# Patient Record
Sex: FEMALE | Race: BLACK OR AFRICAN AMERICAN | NOT HISPANIC OR LATINO | Employment: OTHER | URBAN - METROPOLITAN AREA
[De-identification: names, ages, dates, MRNs, and addresses within clinical notes are randomized per-mention and may not be internally consistent; named-entity substitution may affect disease eponyms.]

---

## 2023-03-04 PROBLEM — E11.9 DIABETES (MULTI): Status: ACTIVE | Noted: 2023-03-04

## 2023-03-04 PROBLEM — R51.9 HEADACHE: Status: ACTIVE | Noted: 2023-03-04

## 2023-03-04 PROBLEM — M85.80 OSTEOPENIA: Status: ACTIVE | Noted: 2023-03-04

## 2023-03-04 PROBLEM — N39.0 RECURRENT UTI: Status: ACTIVE | Noted: 2023-03-04

## 2023-03-04 PROBLEM — H25.13 NUCLEAR SCLEROSIS OF BOTH EYES: Status: ACTIVE | Noted: 2023-03-04

## 2023-03-04 PROBLEM — J44.9 CHRONIC OBSTRUCTIVE PULMONARY DISEASE (MULTI): Status: ACTIVE | Noted: 2023-03-04

## 2023-03-04 PROBLEM — H52.4 BILATERAL PRESBYOPIA: Status: ACTIVE | Noted: 2023-03-04

## 2023-03-04 PROBLEM — K92.2 GI BLEED: Status: ACTIVE | Noted: 2023-03-04

## 2023-03-04 PROBLEM — R25.2 LEG CRAMPS: Status: ACTIVE | Noted: 2023-03-04

## 2023-03-04 PROBLEM — E27.8 ADRENAL MASS (MULTI): Status: ACTIVE | Noted: 2023-03-04

## 2023-03-04 PROBLEM — I73.9 PERIPHERAL VASCULAR DISEASE (CMS-HCC): Status: ACTIVE | Noted: 2023-03-04

## 2023-03-04 PROBLEM — D64.9 ANEMIA: Status: ACTIVE | Noted: 2023-03-04

## 2023-03-04 PROBLEM — E87.6 HYPOKALEMIA: Status: ACTIVE | Noted: 2023-03-04

## 2023-03-04 PROBLEM — E24.9 CUSHING'S SYNDROME (MULTI): Status: ACTIVE | Noted: 2023-03-04

## 2023-03-04 PROBLEM — L67.8 ABNORMAL FACIAL HAIR: Status: ACTIVE | Noted: 2023-03-04

## 2023-03-04 PROBLEM — R65.21 SEPTIC SHOCK (MULTI): Status: ACTIVE | Noted: 2023-03-04

## 2023-03-04 PROBLEM — M25.561 RIGHT KNEE PAIN: Status: ACTIVE | Noted: 2023-03-04

## 2023-03-04 PROBLEM — I48.91 ATRIAL FIBRILLATION (MULTI): Status: ACTIVE | Noted: 2023-03-04

## 2023-03-04 PROBLEM — H52.203 ASTIGMATISM, BILATERAL: Status: ACTIVE | Noted: 2023-03-04

## 2023-03-04 PROBLEM — M17.11 OSTEOARTHRITIS OF RIGHT KNEE: Status: ACTIVE | Noted: 2023-03-04

## 2023-03-04 PROBLEM — Q07.8 ANOMALOUS OPTIC NERVE (MULTI): Status: ACTIVE | Noted: 2023-03-04

## 2023-03-04 PROBLEM — N39.46 URGE AND STRESS INCONTINENCE: Status: ACTIVE | Noted: 2023-03-04

## 2023-03-04 PROBLEM — R00.2 PALPITATION: Status: ACTIVE | Noted: 2023-03-04

## 2023-03-04 PROBLEM — H90.3 SENSORINEURAL HEARING LOSS, BILATERAL: Status: ACTIVE | Noted: 2023-03-04

## 2023-03-04 PROBLEM — H52.13 BILATERAL MYOPIA: Status: ACTIVE | Noted: 2023-03-04

## 2023-03-04 PROBLEM — R09.89 BRUIT OF LEFT CAROTID ARTERY: Status: ACTIVE | Noted: 2023-03-04

## 2023-03-04 PROBLEM — M16.9 OSTEOARTHRITIS OF HIP: Status: ACTIVE | Noted: 2023-03-04

## 2023-03-04 PROBLEM — R73.09 ABNORMAL BLOOD SUGAR: Status: ACTIVE | Noted: 2023-03-04

## 2023-03-04 PROBLEM — N39.0 UTI (URINARY TRACT INFECTION): Status: ACTIVE | Noted: 2023-03-04

## 2023-03-04 PROBLEM — E03.9 HYPOTHYROIDISM: Status: ACTIVE | Noted: 2023-03-04

## 2023-03-04 PROBLEM — R07.89 CHEST PAIN, ATYPICAL: Status: ACTIVE | Noted: 2023-03-04

## 2023-03-04 PROBLEM — N95.2 VAGINAL ATROPHY: Status: ACTIVE | Noted: 2023-03-04

## 2023-03-04 PROBLEM — I10 HYPERTENSION: Status: ACTIVE | Noted: 2023-03-04

## 2023-03-04 PROBLEM — M54.50 LOW BACK PAIN: Status: ACTIVE | Noted: 2023-03-04

## 2023-03-04 PROBLEM — R21 SKIN RASH: Status: ACTIVE | Noted: 2023-03-04

## 2023-03-04 PROBLEM — K63.5 COLON POLYPS: Status: ACTIVE | Noted: 2023-03-04

## 2023-03-04 PROBLEM — H25.013 CORTICAL AGE-RELATED CATARACT OF BOTH EYES: Status: ACTIVE | Noted: 2023-03-04

## 2023-03-04 PROBLEM — K22.89: Status: ACTIVE | Noted: 2023-03-04

## 2023-03-04 PROBLEM — H25.10 AGE-RELATED NUCLEAR CATARACT: Status: ACTIVE | Noted: 2023-03-04

## 2023-03-04 PROBLEM — H40.009 GLAUCOMA SUSPECT: Status: ACTIVE | Noted: 2023-03-04

## 2023-03-04 PROBLEM — N81.4 UTERUS PROLAPSE: Status: ACTIVE | Noted: 2023-03-04

## 2023-03-04 PROBLEM — I50.20 HFREF (HEART FAILURE WITH REDUCED EJECTION FRACTION) (MULTI): Status: ACTIVE | Noted: 2023-03-04

## 2023-03-04 PROBLEM — A41.9 SEPTIC SHOCK (MULTI): Status: ACTIVE | Noted: 2023-03-04

## 2023-03-04 PROBLEM — N17.9 ACUTE RENAL FAILURE (CMS-HCC): Status: ACTIVE | Noted: 2023-03-04

## 2023-03-04 PROBLEM — M17.12 OSTEOARTHRITIS OF LEFT KNEE: Status: ACTIVE | Noted: 2023-03-04

## 2023-03-04 RX ORDER — LOSARTAN POTASSIUM 100 MG/1
1 TABLET ORAL DAILY
COMMUNITY
Start: 2021-10-13 | End: 2023-09-10 | Stop reason: SDUPTHER

## 2023-03-04 RX ORDER — NAPROXEN SODIUM 220 MG/1
1 TABLET, FILM COATED ORAL DAILY
COMMUNITY
Start: 2022-06-16

## 2023-03-04 RX ORDER — FUROSEMIDE 40 MG/1
1 TABLET ORAL DAILY
COMMUNITY
Start: 2021-09-28 | End: 2023-09-10 | Stop reason: SDUPTHER

## 2023-03-04 RX ORDER — ATORVASTATIN CALCIUM 20 MG/1
40 TABLET, FILM COATED ORAL DAILY
COMMUNITY
Start: 2013-05-29 | End: 2023-03-31 | Stop reason: SDUPTHER

## 2023-03-04 RX ORDER — DEXAMETHASONE 1 MG/1
1 TABLET ORAL
COMMUNITY
Start: 2022-06-21 | End: 2024-03-27 | Stop reason: ALTCHOICE

## 2023-03-04 RX ORDER — TIOTROPIUM BROMIDE 18 UG/1
1 CAPSULE ORAL; RESPIRATORY (INHALATION)
COMMUNITY
Start: 2013-05-29 | End: 2023-09-10 | Stop reason: SDUPTHER

## 2023-03-04 RX ORDER — LATANOPROST 50 UG/ML
1 SOLUTION/ DROPS OPHTHALMIC NIGHTLY
COMMUNITY
Start: 2021-07-26 | End: 2024-06-03

## 2023-03-04 RX ORDER — ESOMEPRAZOLE MAGNESIUM 40 MG/1
1 CAPSULE, DELAYED RELEASE ORAL DAILY
COMMUNITY
Start: 2020-08-18

## 2023-03-04 RX ORDER — CHOLECALCIFEROL (VITAMIN D3) 50 MCG
1 TABLET ORAL DAILY
COMMUNITY
Start: 2014-06-03

## 2023-03-04 RX ORDER — PREDNISOLONE ACETATE 10 MG/ML
1 SUSPENSION/ DROPS OPHTHALMIC 4 TIMES DAILY
COMMUNITY
End: 2023-11-01 | Stop reason: WASHOUT

## 2023-03-04 RX ORDER — AMLODIPINE BESYLATE 10 MG/1
1 TABLET ORAL DAILY
COMMUNITY
Start: 2021-10-20 | End: 2023-07-11 | Stop reason: SDUPTHER

## 2023-03-04 RX ORDER — METOPROLOL TARTRATE 100 MG/1
100 TABLET ORAL DAILY
COMMUNITY
Start: 2022-11-12 | End: 2023-09-10 | Stop reason: SDUPTHER

## 2023-03-16 LAB
ANION GAP IN SER/PLAS: 16 MMOL/L (ref 10–20)
CALCIUM (MG/DL) IN SER/PLAS: 9.4 MG/DL (ref 8.6–10.6)
CARBON DIOXIDE, TOTAL (MMOL/L) IN SER/PLAS: 21 MMOL/L (ref 21–32)
CHLORIDE (MMOL/L) IN SER/PLAS: 106 MMOL/L (ref 98–107)
CHOLESTEROL (MG/DL) IN SER/PLAS: 177 MG/DL (ref 0–199)
CHOLESTEROL IN HDL (MG/DL) IN SER/PLAS: 54.3 MG/DL
CHOLESTEROL/HDL RATIO: 3.3
CREATININE (MG/DL) IN SER/PLAS: 0.97 MG/DL (ref 0.5–1.05)
GFR FEMALE: 59 ML/MIN/1.73M2
GLUCOSE (MG/DL) IN SER/PLAS: 90 MG/DL (ref 74–99)
NON-HDL CHOLESTEROL: 123 MG/DL
POTASSIUM (MMOL/L) IN SER/PLAS: 4.2 MMOL/L (ref 3.5–5.3)
SODIUM (MMOL/L) IN SER/PLAS: 139 MMOL/L (ref 136–145)
UREA NITROGEN (MG/DL) IN SER/PLAS: 22 MG/DL (ref 6–23)

## 2023-03-17 ENCOUNTER — TELEPHONE (OUTPATIENT)
Dept: PRIMARY CARE | Facility: CLINIC | Age: 83
End: 2023-03-17

## 2023-03-21 ENCOUNTER — APPOINTMENT (OUTPATIENT)
Dept: PRIMARY CARE | Facility: CLINIC | Age: 83
End: 2023-03-21

## 2023-03-30 ENCOUNTER — TELEPHONE (OUTPATIENT)
Dept: PRIMARY CARE | Facility: CLINIC | Age: 83
End: 2023-03-30

## 2023-03-30 NOTE — TELEPHONE ENCOUNTER
----- Message from Jeremy Huynh MD sent at 3/30/2023  8:24 AM EDT -----  Please schedule appt with me to discuss pvr test results

## 2023-03-31 ENCOUNTER — OFFICE VISIT (OUTPATIENT)
Dept: PRIMARY CARE | Facility: CLINIC | Age: 83
End: 2023-03-31
Payer: COMMERCIAL

## 2023-03-31 VITALS
SYSTOLIC BLOOD PRESSURE: 126 MMHG | RESPIRATION RATE: 16 BRPM | BODY MASS INDEX: 25.16 KG/M2 | DIASTOLIC BLOOD PRESSURE: 82 MMHG | HEART RATE: 72 BPM | WEIGHT: 151.2 LBS

## 2023-03-31 DIAGNOSIS — I73.9 PVD (PERIPHERAL VASCULAR DISEASE) (CMS-HCC): ICD-10-CM

## 2023-03-31 DIAGNOSIS — E78.5 HYPERLIPIDEMIA, UNSPECIFIED HYPERLIPIDEMIA TYPE: ICD-10-CM

## 2023-03-31 DIAGNOSIS — J45.20 MILD INTERMITTENT ASTHMATIC BRONCHITIS WITHOUT COMPLICATION (HHS-HCC): ICD-10-CM

## 2023-03-31 DIAGNOSIS — R05.9 COUGH, UNSPECIFIED TYPE: Primary | ICD-10-CM

## 2023-03-31 PROCEDURE — 99215 OFFICE O/P EST HI 40 MIN: CPT | Performed by: INTERNAL MEDICINE

## 2023-03-31 PROCEDURE — 1159F MED LIST DOCD IN RCRD: CPT | Performed by: INTERNAL MEDICINE

## 2023-03-31 PROCEDURE — 3079F DIAST BP 80-89 MM HG: CPT | Performed by: INTERNAL MEDICINE

## 2023-03-31 PROCEDURE — 1160F RVW MEDS BY RX/DR IN RCRD: CPT | Performed by: INTERNAL MEDICINE

## 2023-03-31 PROCEDURE — 3074F SYST BP LT 130 MM HG: CPT | Performed by: INTERNAL MEDICINE

## 2023-03-31 RX ORDER — PREDNISONE 20 MG/1
TABLET ORAL
Qty: 15 TABLET | Refills: 0 | Status: SHIPPED | OUTPATIENT
Start: 2023-03-31 | End: 2024-03-27 | Stop reason: ALTCHOICE

## 2023-03-31 RX ORDER — ATORVASTATIN CALCIUM 20 MG/1
40 TABLET, FILM COATED ORAL DAILY
Qty: 90 TABLET | Refills: 2 | Status: SHIPPED | OUTPATIENT
Start: 2023-03-31 | End: 2023-03-31 | Stop reason: ENTERED-IN-ERROR

## 2023-03-31 RX ORDER — ALBUTEROL SULFATE 90 UG/1
2 AEROSOL, METERED RESPIRATORY (INHALATION) EVERY 4 HOURS PRN
Qty: 8.5 G | Refills: 0 | Status: SHIPPED | OUTPATIENT
Start: 2023-03-31 | End: 2024-04-16

## 2023-03-31 RX ORDER — ATORVASTATIN CALCIUM 40 MG/1
40 TABLET, FILM COATED ORAL DAILY
Qty: 90 TABLET | Refills: 2 | Status: SHIPPED | OUTPATIENT
Start: 2023-03-31 | End: 2023-09-10 | Stop reason: SDUPTHER

## 2023-03-31 RX ORDER — AMOXICILLIN 500 MG/1
500 CAPSULE ORAL EVERY 8 HOURS SCHEDULED
Qty: 30 CAPSULE | Refills: 0 | Status: SHIPPED | OUTPATIENT
Start: 2023-03-31 | End: 2023-04-10

## 2023-03-31 ASSESSMENT — ENCOUNTER SYMPTOMS
RHINORRHEA: 0
HEMOPTYSIS: 0
DEPRESSION: 0
COUGH: 1
HEARTBURN: 0
SHORTNESS OF BREATH: 1
OCCASIONAL FEELINGS OF UNSTEADINESS: 0
FEVER: 0
LOSS OF SENSATION IN FEET: 0
CHILLS: 0
SORE THROAT: 0
HEADACHES: 0
WHEEZING: 1

## 2023-03-31 ASSESSMENT — PATIENT HEALTH QUESTIONNAIRE - PHQ9
SUM OF ALL RESPONSES TO PHQ9 QUESTIONS 1 AND 2: 0
1. LITTLE INTEREST OR PLEASURE IN DOING THINGS: NOT AT ALL
1. LITTLE INTEREST OR PLEASURE IN DOING THINGS: NOT AT ALL
2. FEELING DOWN, DEPRESSED OR HOPELESS: NOT AT ALL
2. FEELING DOWN, DEPRESSED OR HOPELESS: NOT AT ALL
SUM OF ALL RESPONSES TO PHQ9 QUESTIONS 1 AND 2: 0

## 2023-03-31 NOTE — PROGRESS NOTES
Patient ID: Dora Palmer is a 82 y.o. female who presents for Follow-up (Follow up test results, also still has cough).    /82   Pulse 72   Resp 16   Wt 68.6 kg (151 lb 3.2 oz)   BMI 25.16 kg/m²     Cough  This is a chronic problem. Episode onset: FEW MONTHS. The problem has been unchanged. The problem occurs constantly. The cough is Non-productive. Associated symptoms include nasal congestion, postnasal drip, shortness of breath and wheezing. Pertinent negatives include no chest pain, chills, ear congestion, ear pain, fever, headaches, heartburn, hemoptysis, rhinorrhea or sore throat. Nothing aggravates the symptoms. Risk factors for lung disease include smoking/tobacco exposure. She has tried OTC cough suppressant for the symptoms. The treatment provided no relief. Her past medical history is significant for emphysema.     Also here to discuss test result  He has bilateral carotid Doppler studies  And she has PVR studies too    Patient has COPD  Still smoking  She is often having wheezing  She also feels sometimes short of breath            Subjective     Review of Systems   Constitutional:  Negative for chills and fever.   HENT:  Positive for postnasal drip. Negative for ear pain, rhinorrhea and sore throat.    Respiratory:  Positive for cough, shortness of breath and wheezing. Negative for hemoptysis.    Cardiovascular:  Negative for chest pain.   Gastrointestinal:  Negative for heartburn.   Musculoskeletal:         I get tightness in my calf muscle in both legs when I walk for some time   Neurological:  Negative for headaches.       Objective     Physical Exam  Vitals and nursing note reviewed.   Constitutional:       Appearance: Normal appearance.   Neck:      Vascular: No carotid bruit.   Cardiovascular:      Rate and Rhythm: Normal rate and regular rhythm.      Pulses:           Dorsalis pedis pulses are 1+ on the right side and 1+ on the left side.        Posterior tibial pulses are 1+ on the  right side and 1+ on the left side.      Heart sounds: Normal heart sounds.   Pulmonary:      Effort: Prolonged expiration present.      Breath sounds: Decreased air movement present. Examination of the right-upper field reveals decreased breath sounds and wheezing. Examination of the left-upper field reveals decreased breath sounds and wheezing. Examination of the right-middle field reveals decreased breath sounds and wheezing. Examination of the left-middle field reveals decreased breath sounds and wheezing. Examination of the right-lower field reveals decreased breath sounds and wheezing. Examination of the left-lower field reveals decreased breath sounds and wheezing. Decreased breath sounds and wheezing present.   Musculoskeletal:      Right lower leg: No edema.      Left lower leg: No edema.   Feet:      Right foot:      Skin integrity: Skin integrity normal.      Left foot:      Skin integrity: Skin integrity normal.   Neurological:      Mental Status: She is alert.   Psychiatric:         Mood and Affect: Mood normal.         Behavior: Behavior normal.         Thought Content: Thought content normal.         Judgment: Judgment normal.         Lab Results   Component Value Date    WBC 3.4 (L) 02/28/2023    HGB 14.4 02/28/2023    HCT 43.8 02/28/2023    MCV 96 02/28/2023     02/28/2023           Problem List Items Addressed This Visit    None  Visit Diagnoses       Cough, unspecified type    -  Primary    Relevant Orders    XR chest 2 views    PVD (peripheral vascular disease) (CMS/[facility])        Relevant Medications    atorvastatin (Lipitor) 40 mg tablet    Other Relevant Orders    Referral to Vascular Surgery    Hyperlipidemia, unspecified hyperlipidemia type        Relevant Medications    atorvastatin (Lipitor) 40 mg tablet             A/P        Patient is severe peripheral vascular disease  She is having significant claudication in both legs  I told her she needs to stop smoking  And needs to do some  graded exercise involving both legs  Referral placed for vascular  I have optimized atorvastatin from 20 mg to 40 mg  Discussed the effect of smoking on overall physical and mental wellbeing  That smoking can cause peripheral vascular disease it can be a risk factor for stroke  Heart attack cancer osteoporosis  Since she has chronic cough for more than a month  I suspect this is mostly related to her COPD  I will get a chest x-ray right now

## 2023-05-09 ENCOUNTER — HOSPITAL ENCOUNTER (OUTPATIENT)
Dept: DATA CONVERSION | Facility: HOSPITAL | Age: 83
End: 2023-05-09
Attending: OPHTHALMOLOGY | Admitting: OPHTHALMOLOGY
Payer: MEDICARE

## 2023-05-09 DIAGNOSIS — D64.9 ANEMIA, UNSPECIFIED: ICD-10-CM

## 2023-05-09 DIAGNOSIS — E11.36 TYPE 2 DIABETES MELLITUS WITH DIABETIC CATARACT (MULTI): ICD-10-CM

## 2023-05-09 DIAGNOSIS — F17.200 NICOTINE DEPENDENCE, UNSPECIFIED, UNCOMPLICATED: ICD-10-CM

## 2023-05-09 DIAGNOSIS — I50.33 ACUTE ON CHRONIC DIASTOLIC (CONGESTIVE) HEART FAILURE (MULTI): ICD-10-CM

## 2023-05-09 DIAGNOSIS — I48.0 PAROXYSMAL ATRIAL FIBRILLATION (MULTI): ICD-10-CM

## 2023-05-09 DIAGNOSIS — Z79.01 LONG TERM (CURRENT) USE OF ANTICOAGULANTS: ICD-10-CM

## 2023-05-09 DIAGNOSIS — H40.1121 PRIMARY OPEN-ANGLE GLAUCOMA, LEFT EYE, MILD STAGE: ICD-10-CM

## 2023-05-09 DIAGNOSIS — K21.9 GASTRO-ESOPHAGEAL REFLUX DISEASE WITHOUT ESOPHAGITIS: ICD-10-CM

## 2023-05-09 DIAGNOSIS — E03.9 HYPOTHYROIDISM, UNSPECIFIED: ICD-10-CM

## 2023-05-09 DIAGNOSIS — J44.9 CHRONIC OBSTRUCTIVE PULMONARY DISEASE, UNSPECIFIED (MULTI): ICD-10-CM

## 2023-05-09 DIAGNOSIS — H25.812 COMBINED FORMS OF AGE-RELATED CATARACT, LEFT EYE: ICD-10-CM

## 2023-05-09 DIAGNOSIS — I11.0 HYPERTENSIVE HEART DISEASE WITH HEART FAILURE (MULTI): ICD-10-CM

## 2023-05-09 DIAGNOSIS — I73.9 PERIPHERAL VASCULAR DISEASE, UNSPECIFIED (CMS-HCC): ICD-10-CM

## 2023-05-09 DIAGNOSIS — E11.51 TYPE 2 DIABETES MELLITUS WITH DIABETIC PERIPHERAL ANGIOPATHY WITHOUT GANGRENE (MULTI): ICD-10-CM

## 2023-05-09 DIAGNOSIS — Z79.82 LONG TERM (CURRENT) USE OF ASPIRIN: ICD-10-CM

## 2023-05-09 DIAGNOSIS — E86.0 DEHYDRATION: ICD-10-CM

## 2023-05-09 DIAGNOSIS — Z86.19 PERSONAL HISTORY OF OTHER INFECTIOUS AND PARASITIC DISEASES: ICD-10-CM

## 2023-05-09 DIAGNOSIS — Z87.440 PERSONAL HISTORY OF URINARY (TRACT) INFECTIONS: ICD-10-CM

## 2023-05-26 ENCOUNTER — HOSPITAL ENCOUNTER (OUTPATIENT)
Dept: DATA CONVERSION | Facility: HOSPITAL | Age: 83
End: 2023-05-26
Attending: OPHTHALMOLOGY | Admitting: OPHTHALMOLOGY
Payer: MEDICARE

## 2023-05-26 DIAGNOSIS — I48.20 CHRONIC ATRIAL FIBRILLATION, UNSPECIFIED (MULTI): ICD-10-CM

## 2023-05-26 DIAGNOSIS — J44.9 CHRONIC OBSTRUCTIVE PULMONARY DISEASE, UNSPECIFIED (MULTI): ICD-10-CM

## 2023-05-26 DIAGNOSIS — D64.9 ANEMIA, UNSPECIFIED: ICD-10-CM

## 2023-05-26 DIAGNOSIS — I11.0 HYPERTENSIVE HEART DISEASE WITH HEART FAILURE (MULTI): ICD-10-CM

## 2023-05-26 DIAGNOSIS — E03.9 HYPOTHYROIDISM, UNSPECIFIED: ICD-10-CM

## 2023-05-26 DIAGNOSIS — E11.9 TYPE 2 DIABETES MELLITUS WITHOUT COMPLICATIONS (MULTI): ICD-10-CM

## 2023-05-26 DIAGNOSIS — H10.401: ICD-10-CM

## 2023-05-26 DIAGNOSIS — I50.9 HEART FAILURE, UNSPECIFIED (MULTI): ICD-10-CM

## 2023-05-26 DIAGNOSIS — F17.200 NICOTINE DEPENDENCE, UNSPECIFIED, UNCOMPLICATED: ICD-10-CM

## 2023-05-26 DIAGNOSIS — K21.9 GASTRO-ESOPHAGEAL REFLUX DISEASE WITHOUT ESOPHAGITIS: ICD-10-CM

## 2023-05-26 DIAGNOSIS — M19.90 UNSPECIFIED OSTEOARTHRITIS, UNSPECIFIED SITE: ICD-10-CM

## 2023-05-26 DIAGNOSIS — E78.5 HYPERLIPIDEMIA, UNSPECIFIED: ICD-10-CM

## 2023-05-26 DIAGNOSIS — H02.102 UNSPECIFIED ECTROPION OF RIGHT LOWER EYELID: ICD-10-CM

## 2023-05-26 DIAGNOSIS — I73.9 PERIPHERAL VASCULAR DISEASE, UNSPECIFIED (CMS-HCC): ICD-10-CM

## 2023-05-26 DIAGNOSIS — D22.112 MELANOCYTIC NEVI OF RIGHT LOWER EYELID, INCLUDING CANTHUS: ICD-10-CM

## 2023-06-05 ENCOUNTER — APPOINTMENT (OUTPATIENT)
Dept: PRIMARY CARE | Facility: CLINIC | Age: 83
End: 2023-06-05
Payer: MEDICARE

## 2023-06-13 LAB
COMPLETE PATHOLOGY REPORT: NORMAL
CONVERTED CLINICAL DIAGNOSIS-HISTORY: NORMAL
CONVERTED FINAL DIAGNOSIS: NORMAL
CONVERTED FINAL REPORT PDF LINK TO COPY AND PASTE: NORMAL
CONVERTED GROSS DESCRIPTION: NORMAL

## 2023-07-06 ENCOUNTER — TELEPHONE (OUTPATIENT)
Dept: PRIMARY CARE | Facility: CLINIC | Age: 83
End: 2023-07-06
Payer: MEDICARE

## 2023-07-06 NOTE — TELEPHONE ENCOUNTER
Pt. States that her blood pressure has been running between 201/108 and 160/85 for about a week now. States did see cardiology a couple of weeks ago and he added Prazosin at the visit due to high bp. Asking what she should do. She has been transferred to  to get first available appt. Please advise.

## 2023-07-11 ENCOUNTER — OFFICE VISIT (OUTPATIENT)
Dept: PRIMARY CARE | Facility: CLINIC | Age: 83
End: 2023-07-11
Payer: MEDICARE

## 2023-07-11 VITALS
DIASTOLIC BLOOD PRESSURE: 84 MMHG | SYSTOLIC BLOOD PRESSURE: 156 MMHG | WEIGHT: 154.4 LBS | OXYGEN SATURATION: 97 % | HEART RATE: 63 BPM | BODY MASS INDEX: 26.5 KG/M2

## 2023-07-11 DIAGNOSIS — N39.46 URGE AND STRESS INCONTINENCE: ICD-10-CM

## 2023-07-11 DIAGNOSIS — N18.31 STAGE 3A CHRONIC KIDNEY DISEASE (MULTI): ICD-10-CM

## 2023-07-11 DIAGNOSIS — I50.20 HFREF (HEART FAILURE WITH REDUCED EJECTION FRACTION) (MULTI): ICD-10-CM

## 2023-07-11 DIAGNOSIS — E11.51 TYPE 2 DIABETES MELLITUS WITH DIABETIC PERIPHERAL ANGIOPATHY WITHOUT GANGRENE, WITHOUT LONG-TERM CURRENT USE OF INSULIN (MULTI): ICD-10-CM

## 2023-07-11 DIAGNOSIS — I10 HYPERTENSION, UNSPECIFIED TYPE: Primary | ICD-10-CM

## 2023-07-11 DIAGNOSIS — J44.9 CHRONIC OBSTRUCTIVE PULMONARY DISEASE, UNSPECIFIED COPD TYPE (MULTI): ICD-10-CM

## 2023-07-11 DIAGNOSIS — I48.0 PAROXYSMAL ATRIAL FIBRILLATION (MULTI): ICD-10-CM

## 2023-07-11 DIAGNOSIS — I73.9 PERIPHERAL VASCULAR DISEASE (CMS-HCC): ICD-10-CM

## 2023-07-11 DIAGNOSIS — E27.8 ADRENAL MASS (MULTI): ICD-10-CM

## 2023-07-11 DIAGNOSIS — R09.89 BRUIT OF LEFT CAROTID ARTERY: ICD-10-CM

## 2023-07-11 PROCEDURE — 3077F SYST BP >= 140 MM HG: CPT | Performed by: INTERNAL MEDICINE

## 2023-07-11 PROCEDURE — 99213 OFFICE O/P EST LOW 20 MIN: CPT | Performed by: INTERNAL MEDICINE

## 2023-07-11 PROCEDURE — 1160F RVW MEDS BY RX/DR IN RCRD: CPT | Performed by: INTERNAL MEDICINE

## 2023-07-11 PROCEDURE — 1159F MED LIST DOCD IN RCRD: CPT | Performed by: INTERNAL MEDICINE

## 2023-07-11 PROCEDURE — 3079F DIAST BP 80-89 MM HG: CPT | Performed by: INTERNAL MEDICINE

## 2023-07-11 PROCEDURE — 1126F AMNT PAIN NOTED NONE PRSNT: CPT | Performed by: INTERNAL MEDICINE

## 2023-07-11 RX ORDER — ATORVASTATIN CALCIUM 20 MG/1
20 TABLET, FILM COATED ORAL DAILY
COMMUNITY
End: 2024-03-27 | Stop reason: ALTCHOICE

## 2023-07-11 RX ORDER — PRAZOSIN HYDROCHLORIDE 1 MG/1
1 CAPSULE ORAL 2 TIMES DAILY
COMMUNITY
Start: 2023-06-15 | End: 2024-04-30

## 2023-07-11 RX ORDER — AMLODIPINE BESYLATE 10 MG/1
10 TABLET ORAL DAILY
Qty: 90 TABLET | Refills: 2 | Status: SHIPPED | OUTPATIENT
Start: 2023-07-11 | End: 2023-09-10 | Stop reason: SDUPTHER

## 2023-07-11 ASSESSMENT — PATIENT HEALTH QUESTIONNAIRE - PHQ9
1. LITTLE INTEREST OR PLEASURE IN DOING THINGS: NOT AT ALL
2. FEELING DOWN, DEPRESSED OR HOPELESS: NOT AT ALL
SUM OF ALL RESPONSES TO PHQ9 QUESTIONS 1 AND 2: 0

## 2023-07-23 PROBLEM — N18.30 CHRONIC KIDNEY DISEASE (CKD), STAGE III (MODERATE) (MULTI): Status: ACTIVE | Noted: 2023-07-23

## 2023-07-23 PROBLEM — A41.9 SEPTIC SHOCK (MULTI): Status: RESOLVED | Noted: 2023-03-04 | Resolved: 2023-07-23

## 2023-07-23 PROBLEM — R65.21 SEPTIC SHOCK (MULTI): Status: RESOLVED | Noted: 2023-03-04 | Resolved: 2023-07-23

## 2023-07-23 ASSESSMENT — PATIENT HEALTH QUESTIONNAIRE - PHQ9
SUM OF ALL RESPONSES TO PHQ9 QUESTIONS 1 AND 2: 0
2. FEELING DOWN, DEPRESSED OR HOPELESS: NOT AT ALL
1. LITTLE INTEREST OR PLEASURE IN DOING THINGS: NOT AT ALL

## 2023-07-23 ASSESSMENT — ENCOUNTER SYMPTOMS
BLURRED VISION: 0
CONSTITUTIONAL NEGATIVE: 1
HYPERTENSION: 1
ORTHOPNEA: 0
NECK PAIN: 0
SHORTNESS OF BREATH: 0
LOSS OF SENSATION IN FEET: 0
PND: 0
DEPRESSION: 0
DIZZINESS: 1
PALPITATIONS: 1
HEADACHES: 0
OCCASIONAL FEELINGS OF UNSTEADINESS: 0

## 2023-07-23 NOTE — PROGRESS NOTES
Patient ID: Dora Palmer is a 82 y.o. female who presents for Hypertension (Bp running high; also feeling lethargic), chest sensation (Like a flutter on right side of chest), and Follow-up (Emergency room for high bp).    /84   Pulse 63   Wt 70 kg (154 lb 6.4 oz)   SpO2 97%   BMI 26.50 kg/m²     Hypertension  This is a chronic problem. The current episode started more than 1 year ago. The problem is unchanged. The problem is uncontrolled. Associated symptoms include anxiety and palpitations. Pertinent negatives include no blurred vision, chest pain, headaches, malaise/fatigue, neck pain, orthopnea, peripheral edema, PND or shortness of breath. There are no associated agents to hypertension. Risk factors for coronary artery disease include diabetes mellitus, dyslipidemia and smoking/tobacco exposure. Past treatments include ACE inhibitors. The current treatment provides no improvement. Hypertensive end-organ damage includes heart failure and PVD. There is no history of angina, kidney disease, CAD/MI, CVA, left ventricular hypertrophy or retinopathy. There is no history of chronic renal disease or sleep apnea.       Subjective     Review of Systems   Constitutional: Negative.  Negative for malaise/fatigue.   Eyes:  Negative for blurred vision.   Respiratory:  Negative for shortness of breath.    Cardiovascular:  Positive for palpitations. Negative for chest pain, orthopnea and PND.   Musculoskeletal:  Negative for neck pain.   Neurological:  Positive for dizziness. Negative for headaches.   All other systems reviewed and are negative.      Objective     Physical Exam  Vitals and nursing note reviewed.   Neck:      Vascular: Carotid bruit present.   Cardiovascular:      Rate and Rhythm: Normal rate and regular rhythm.      Pulses: Normal pulses.      Heart sounds: Normal heart sounds. No murmur heard.  Pulmonary:      Effort: Pulmonary effort is normal.      Breath sounds: Normal breath sounds.    Musculoskeletal:      Right lower leg: No edema.      Left lower leg: No edema.   Lymphadenopathy:      Cervical: No cervical adenopathy.   Skin:     Capillary Refill: Capillary refill takes 2 to 3 seconds.   Neurological:      General: No focal deficit present.      Mental Status: She is oriented to person, place, and time. Mental status is at baseline.   Psychiatric:         Mood and Affect: Mood normal.         Behavior: Behavior normal.         Thought Content: Thought content normal.         Judgment: Judgment normal.         Lab Results   Component Value Date    WBC 3.4 (L) 02/28/2023    HGB 14.4 02/28/2023    HCT 43.8 02/28/2023    MCV 96 02/28/2023     02/28/2023           Problem List Items Addressed This Visit       Adrenal mass (CMS/HCC)    Atrial fibrillation (CMS/HCC)    Relevant Medications    amLODIPine (Norvasc) 10 mg tablet    Chronic obstructive pulmonary disease (CMS/HCC)    Diabetes (CMS/HCC)    HFrEF (heart failure with reduced ejection fraction) (CMS/HCC)    Relevant Medications    amLODIPine (Norvasc) 10 mg tablet    Hypertension - Primary    Relevant Medications    amLODIPine (Norvasc) 10 mg tablet    Peripheral vascular disease (CMS/HCC)    Urge and stress incontinence    Bruit of left carotid artery           A/P      Continue prazosin 2 mg every day  Continue losartan 100 mg every day  Will start amlodipine 10 mg 1 pill every day  BP recheck with me in 3 weeks time        Advance Care Planning Note     Discussion Date: 07/23/23   Discussion Participants: patient    The patient wishes to discuss Advance Care Planning today and the following is a brief summary of our discussion.     Patient has capacity to make their own medical decisions: Yes  Health Care Agent/Surrogate Decision Maker documented in chart: No    Documents on file and valid:  Advance Directive/Living Will: No   Health Care Power of : No  Other:     Communication of Medical Status/Prognosis:         Communication of Treatment Goals/Options:       Cussed with the patient end-of-life choices patient is full code at the moment she does not have a living will or healthcare power of  she will think about getting it done at some point and then will bring it back on next office visit so that it can be scanned into the chart for the purpose of documentation    Treatment Decisions  Goals of Care: survival is paramount regardless of prognosis, treatment outcome, or burden       Follow Up Plan        Team Members        Time Statement: Total face to face time spent on advance care planning was 5 minutes with 5 minutes spent in counseling, including the explanation.    Jeremy Huynh MD  7/23/2023 3:49 PM

## 2023-08-01 ENCOUNTER — APPOINTMENT (OUTPATIENT)
Dept: PRIMARY CARE | Facility: CLINIC | Age: 83
End: 2023-08-01
Payer: MEDICARE

## 2023-09-10 DIAGNOSIS — E78.5 HYPERLIPIDEMIA, UNSPECIFIED HYPERLIPIDEMIA TYPE: ICD-10-CM

## 2023-09-10 DIAGNOSIS — I73.9 PVD (PERIPHERAL VASCULAR DISEASE) (CMS-HCC): ICD-10-CM

## 2023-09-10 DIAGNOSIS — I10 HYPERTENSION, UNSPECIFIED TYPE: ICD-10-CM

## 2023-09-10 DIAGNOSIS — J44.9 CHRONIC OBSTRUCTIVE PULMONARY DISEASE, UNSPECIFIED COPD TYPE (MULTI): ICD-10-CM

## 2023-09-10 DIAGNOSIS — R60.0 EDEMA OF BOTH LEGS: Primary | ICD-10-CM

## 2023-09-10 DIAGNOSIS — I10 PRIMARY HYPERTENSION: ICD-10-CM

## 2023-09-11 RX ORDER — TIOTROPIUM BROMIDE 18 UG/1
1 CAPSULE ORAL; RESPIRATORY (INHALATION)
Qty: 90 CAPSULE | Refills: 2 | Status: SHIPPED | OUTPATIENT
Start: 2023-09-11

## 2023-09-11 RX ORDER — FUROSEMIDE 40 MG/1
40 TABLET ORAL DAILY
Qty: 90 TABLET | Refills: 0 | Status: SHIPPED | OUTPATIENT
Start: 2023-09-11 | End: 2023-12-17

## 2023-09-11 RX ORDER — AMLODIPINE BESYLATE 10 MG/1
10 TABLET ORAL DAILY
Qty: 90 TABLET | Refills: 2 | Status: SHIPPED | OUTPATIENT
Start: 2023-09-11

## 2023-09-11 RX ORDER — METOPROLOL TARTRATE 100 MG/1
100 TABLET ORAL DAILY
Qty: 90 TABLET | Refills: 2 | Status: SHIPPED | OUTPATIENT
Start: 2023-09-11 | End: 2024-06-06 | Stop reason: ALTCHOICE

## 2023-09-11 RX ORDER — ATORVASTATIN CALCIUM 40 MG/1
40 TABLET, FILM COATED ORAL DAILY
Qty: 90 TABLET | Refills: 1 | Status: SHIPPED | OUTPATIENT
Start: 2023-09-11 | End: 2023-11-06

## 2023-09-11 RX ORDER — LOSARTAN POTASSIUM 100 MG/1
100 TABLET ORAL DAILY
Qty: 90 TABLET | Refills: 2 | Status: SHIPPED | OUTPATIENT
Start: 2023-09-11

## 2023-09-14 NOTE — H&P
History of Present Illness:   History Present Illness:  Reason for surgery: combined cataract, left eye   HPI:    patient presents for cataract extraction with IOL insertion with goniotomy, left eye     Allergies:        Allergies:  ·  No Known Allergies :     Home Medication Review:   Home Medications Reviewed: yes     Impression/Procedure:   ·  Impression and Planned Procedure: cataract extraction with IOL insertion with goniotomy, left eye       ERAS (Enhanced Recovery After Surgery):  ·  ERAS Patient: no       Physical Exam by System:    Constitutional: Well developed, awake/alert/oriented  x3, no distress, alert and cooperative   Eyes: PERRL, EOMI, clear sclera   Respiratory/Thorax: Patent airways, good chest expansion,  thorax symmetric   Cardiovascular: Regular, rate and rhythm   Psychological: Appropriate mood and behavior     Consent:   COVID-19 Consent:  ·  COVID-19 Risk Consent Surgeon has reviewed key risks related to the risk of marjorie COVID-19 and if they contract COVID-19 what the risks are.     Attestation:   Note Completion:  I am a:  Resident/Fellow   Attending Attestation I saw and evaluated the patient.  I personally obtained the key and critical portions of the history and physical exam or was physically present for key and  critical portions performed by the resident/fellow. I reviewed the resident/fellow?s documentation and discussed the patient with the resident/fellow.  I agree with the resident/fellow?s medical decision making as documented in the note.     I personally evaluated the patient on 09-May-2023         Electronic Signatures:  Abdirahman Irizarry (Resident))  (Signed 09-May-2023 07:10)   Authored: History of Present Illness, Allergies, Home  Medication Review, Impression/Procedure, ERAS, Physical Exam, Consent, Note Completion  Shoaib Diego)  (Signed 09-May-2023 17:02)   Authored: Note Completion   Co-Signer: History of Present Illness, Allergies, Home Medication  Review, Impression/Procedure, ERAS, Physical Exam, Consent, Note Completion      Last Updated: 09-May-2023 17:02 by Shoaib Diego)

## 2023-09-30 NOTE — H&P
History of Present Illness:   History Present Illness:  Reason for surgery: RLL ectropion   HPI:    RLL ectropion repair and full thickness excision and repair of lesion        Allergies:        Allergies:  ·  No Known Allergies :     Home Medication Review:   Home Medications Reviewed: yes     Impression/Procedure:   ·  Impression and Planned Procedure: RLL ectropion repair and full thickness excision and repair of lesion       ERAS (Enhanced Recovery After Surgery):  ·  ERAS Patient: no       Physical Exam by System:    Constitutional: Well developed, awake/alert/oriented  x3, no distress, alert and cooperative   Eyes: PERRL, EOMI, clear sclera   Respiratory/Thorax: Patent airways, good chest expansion,  thorax symmetric   Cardiovascular: Regular, rate and rhythm   Psychological: Appropriate mood and behavior     Consent:   COVID-19 Consent:  ·  COVID-19 Risk Consent Surgeon has reviewed key risks related to the risk of marjorie COVID-19 and if they contract COVID-19 what the risks are.     Attestation:   Note Completion:  I am a:  Resident/Fellow   Attending Attestation I saw and evaluated the patient.  I personally obtained the key and critical portions of the history and physical exam or was physically present for key and  critical portions performed by the resident/fellow. I reviewed the resident/fellow?s documentation and discussed the patient with the resident/fellow.  I agree with the resident/fellow?s medical decision making as documented in the note.     I personally evaluated the patient on 26-May-2023         Electronic Signatures:  Abdirahman Irizarry (Resident))  (Signed 26-May-2023 07:21)   Authored: History of Present Illness, Allergies, Home  Medication Review, Impression/Procedure, ERAS, Physical Exam, Consent, Note Completion  Josiah Gomez)  (Signed 26-May-2023 11:07)   Authored: Note Completion   Co-Signer: History of Present Illness, Allergies, Home Medication Review,  Impression/Procedure, ERAS, Physical Exam, Consent, Note Completion      Last Updated: 26-May-2023 11:07 by Josiah Gomez)

## 2023-10-02 NOTE — OP NOTE
Post Operative Note:     PreOp Diagnosis: Right lower eyelid pigmented lesion,  Right lower eyelid ectropion   Post-Procedure Diagnosis: Right lower eyelid pigmented  lesion, Right lower eyelid ectropion   Procedure: 1. Right lower eyelid lesion full thickness  excision with reconstruction - 6mm   Surgeon: Josiah Gomez MD   Resident/Fellow/Other Assistant: Abdirahman Irizarry MD,  Carroll Daley MD   Anesthesia: MAC, local   Estimated Blood Loss (mL): none  <5cc   Specimen: yes   Complications: none   Findings: rll lesion suspected benign nevus     Operative Report Dictated:  Dictation: not applicable - note contains Operative  Report   Operative Report:      The patient was taken to the operating room and placed on the table in the supine position. A timeout was performed which confirmed the  operative site and procedure. Anesthesia was then induced. Three mls of 1% lidocaine with 1:100,000 epiphrine was injected into the surgical site, and the patient was prepped and draped in the usual manner for orbitofacial surgery.     Attention was turned to the Right lower eyelid Lesion measuring approximately  ~6mm in width. A full thickness segment of eyelid was excised with the #15 blade and Delonte scissors and the tissue was sent for  pathology processing. Next horizontal mattress suture was used to approximate the eyelid margin along the tarsus using 6-0 vicryl suture. Additional interrupted suture was used to approximate the tarsal plate along the inferior aspect of the plate and  the lash line. Additional deep sutures were placed to approximate the orbicularis. The skin was closed using 5-0 fast absorbing gut suture in interrupted fashion.     The eyelid was then assessed and it was found that the lesion excision and repair had led to improved lid position without ectropion. The lower lid tension was also improved. Therefore the lateral tarsal strip was deferred at this time.    Antibiotic was applied to the area.  The patient was then awakened and taken from the operating room in good condition,  having tolerated the procedure well. There were no complications, and the estimated blood loss was less than 5 cc.      Attestation:   Note Completion:  I am a: Resident/Fellow   Attending Attestation I was present for the entire procedure          Electronic Signatures:  Abdirahman Irizarry (Resident))  (Signed 26-May-2023 09:53)   Authored: Post Operative Note, Note Completion  Josiah Gomez)  (Signed 26-May-2023 15:16)   Authored: Post Operative Note, Note Completion   Co-Signer: Post Operative Note, Note Completion      Last Updated: 26-May-2023 15:16 by Josiah Gomez)

## 2023-10-02 NOTE — OP NOTE
Post Operative Note:     PreOp Diagnosis: Cataract, left eye - H25.812;Primary  open angle glaucoma, left eye - H40.1121   Post-Procedure Diagnosis: Cataract, left eye - H25.812;  Primary open angle glaucoma, left eye - H40.1121   Procedure: 1. cataract extraction with IOL insertion,  left eye   2. Goniotomy with KDB, left eye   Surgeon: Shoaib Diego MD   Resident/Fellow/Other Assistant: Abdirahman Irizarry MD   Anesthesia: MAC, . block with 0.75% Marcaine 2% lidocaine  1:1 mixture   Estimated Blood Loss (mL): <!   Specimen: no   Findings: combined cataract     Operative Report Dictated:  Dictation: not applicable - note contains Operative  Report   Operative Report:    Patient was identified using two unique identifiers in the preoperative area and the operative side was marked on the forehead using  a marking pen after the patient stated procedure and laterality which was confirmed by reviewing the informed consent form. The patient was then taken to the operative suite where etelvina-bulbar anesthesia was administered consisting of 2% lidocaine and  0.75% Marcaine The area was then prepped and draped in the standard sterile fashion for intraocular surgery of the left eye.  The head and microscope were positioned for angle surgery. A 15 degree supersharp blade was used enter the anterior chamber to create a paracentesis side port incision. A 2.65 mm keratome was used to create a beveled temporal clear corneal incision. The  anterior chamber was infused with Viscoat viscoelastic and some was placed on the dome of the cornea. Under direct visualization, the [place] Dual Blade knife was inserted into the anterior chamber. A modified [place] lens was used to visualize the  anterior chamber angle. The KDB blade was inserted into the Schlemms canal and approximately 90 degrees of nasal trabecular meshwork  was ablated. Appropriate reflux blood was present. The head and microscope were re-positioned for cataract  surgery.  A dispersive viscoelastic was used to maintain the anterior chamber. A curvilinear continuous capsulorhexis was initiated using a bent  cystitome needle and completed using utrata forceps. Hydrodissection and hydrodelineation were performed. Phacoemulsification was used to remove all nuclear material. The phaco energy time was 10.07 . Irrigation/aspiration was used to remove all cortical material. The anterior chamber was refilled using a cohesive viscoelastic. The [id number] 10.5  D lens was inserted into the capsular bag using the injector system and the trailing haptic was gently positioned within the capsular bag using a Sinsky hook. Irrigation/aspiration was used remove all viscoelastic material. The anterior chamber was refilled  using BSS and all wounds were stromally hydrated and found to be water tight by dry weck cell testing. The pressure was physiologic  by applanation Subtenon?s injections of ancef and decadron were given. Tobradex ointment, sterile patch and shield were then placed over the eye, and the patient was taken to recovery having tolerated the procedure well      Attestation:   Note Completion:  I am a: Resident/Fellow   Attending Attestation I was present for the entire procedure          Electronic Signatures:  Abdirahman Irizarry (Resident))  (Signed 09-May-2023 10:58)   Authored: Post Operative Note, Note Completion  Shoaib Diego)  (Signed 09-May-2023 17:08)   Authored: Note Completion   Co-Signer: Post Operative Note, Note Completion      Last Updated: 09-May-2023 17:08 by Shoaib Diego)

## 2023-11-01 ENCOUNTER — OFFICE VISIT (OUTPATIENT)
Dept: OPHTHALMOLOGY | Facility: CLINIC | Age: 83
End: 2023-11-01
Payer: MEDICARE

## 2023-11-01 DIAGNOSIS — H40.1131 PRIMARY OPEN ANGLE GLAUCOMA OF BOTH EYES, MILD STAGE: Primary | ICD-10-CM

## 2023-11-01 DIAGNOSIS — Z96.1 PSEUDOPHAKIA OF BOTH EYES: ICD-10-CM

## 2023-11-01 PROCEDURE — 99213 OFFICE O/P EST LOW 20 MIN: CPT | Performed by: OPHTHALMOLOGY

## 2023-11-01 ASSESSMENT — CONF VISUAL FIELD
OD_INFERIOR_TEMPORAL_RESTRICTION: 0
OS_SUPERIOR_NASAL_RESTRICTION: 0
OS_SUPERIOR_TEMPORAL_RESTRICTION: 0
OS_INFERIOR_TEMPORAL_RESTRICTION: 0
OS_NORMAL: 1
OD_SUPERIOR_NASAL_RESTRICTION: 0
OS_INFERIOR_NASAL_RESTRICTION: 0
OD_NORMAL: 1
METHOD: COUNTING FINGERS
OD_SUPERIOR_TEMPORAL_RESTRICTION: 0
OD_INFERIOR_NASAL_RESTRICTION: 0

## 2023-11-01 ASSESSMENT — VISUAL ACUITY
OS_CC: 20/25
CORRECTION_TYPE: GLASSES
OD_CC: 20/25
OD_CC+: -2
METHOD: SNELLEN - LINEAR

## 2023-11-01 ASSESSMENT — TONOMETRY
IOP_METHOD: GOLDMANN APPLANATION
OD_IOP_MMHG: 12
OS_IOP_MMHG: 13

## 2023-11-01 ASSESSMENT — ENCOUNTER SYMPTOMS: EYES NEGATIVE: 1

## 2023-11-01 ASSESSMENT — CUP TO DISC RATIO
OD_RATIO: 0.6
OS_RATIO: 0.75

## 2023-11-01 ASSESSMENT — SLIT LAMP EXAM - LIDS: COMMENTS: NORMAL

## 2023-11-01 ASSESSMENT — PACHYMETRY
OD_CT(UM): 666
OS_CT(UM): 662

## 2023-11-01 NOTE — PROGRESS NOTES
Visual Acuity (Snellen - Linear)         Right Left    Dist cc 20/25 -2 20/25      Correction: Glasses          Tonometry       Tonometry (Goldmann Applanation, 10:44 AM)         Right Left    Pressure 12 13                  Assessment/Plan   Last dilated:  1/5/23  specular microscopy OD:  2,698  last macular OCT WNL 4/5/23    1.  Primary Open-Angle Glaucoma OU:  /662 Tm 18/19.  IOPs should be in the low teens given progression at upper teens.   s/p combined with KDB OD 2/14/23:  pre-op VA 20/60, IOP 18 mmHg.  Toric @ approx 95 degrees and target 97.  s/p combined with KDB OS 5/10/23:  pre-op VA 20/50, IOP 15 mmHg.  IOPs are now very well controlled     Plan:  cont latanoprost OU QHS               f/u 2 months (HVF, dilation, RNFL)    3.  Pseudophakia (PCIOL - toric) OU:  no visually significant PCO     Plan:  monitor    3.  RLL Nevus:  pt would like removal     Plan:  as per Dr. Gomez

## 2023-11-05 DIAGNOSIS — I73.9 PVD (PERIPHERAL VASCULAR DISEASE) (CMS-HCC): ICD-10-CM

## 2023-11-05 DIAGNOSIS — E78.5 HYPERLIPIDEMIA, UNSPECIFIED HYPERLIPIDEMIA TYPE: ICD-10-CM

## 2023-11-06 RX ORDER — ATORVASTATIN CALCIUM 40 MG/1
40 TABLET, FILM COATED ORAL DAILY
Qty: 100 TABLET | Refills: 1 | Status: SHIPPED | OUTPATIENT
Start: 2023-11-06 | End: 2024-05-01

## 2023-12-14 ENCOUNTER — LAB (OUTPATIENT)
Dept: LAB | Facility: LAB | Age: 83
End: 2023-12-14
Payer: MEDICARE

## 2023-12-14 ENCOUNTER — OFFICE VISIT (OUTPATIENT)
Dept: PRIMARY CARE | Facility: CLINIC | Age: 83
End: 2023-12-14
Payer: MEDICARE

## 2023-12-14 VITALS
HEART RATE: 66 BPM | DIASTOLIC BLOOD PRESSURE: 73 MMHG | SYSTOLIC BLOOD PRESSURE: 117 MMHG | BODY MASS INDEX: 26.29 KG/M2 | WEIGHT: 158 LBS | OXYGEN SATURATION: 97 %

## 2023-12-14 DIAGNOSIS — I48.0 PAROXYSMAL ATRIAL FIBRILLATION (MULTI): ICD-10-CM

## 2023-12-14 DIAGNOSIS — E03.9 HYPOTHYROIDISM, UNSPECIFIED TYPE: ICD-10-CM

## 2023-12-14 DIAGNOSIS — R60.0 EDEMA OF BOTH LEGS: ICD-10-CM

## 2023-12-14 DIAGNOSIS — Z01.84 IMMUNITY STATUS TESTING: ICD-10-CM

## 2023-12-14 DIAGNOSIS — I10 HYPERTENSION, UNSPECIFIED TYPE: Primary | Chronic | ICD-10-CM

## 2023-12-14 DIAGNOSIS — Q07.8 ANOMALOUS OPTIC NERVE (MULTI): ICD-10-CM

## 2023-12-14 DIAGNOSIS — I73.9 PERIPHERAL VASCULAR DISEASE (CMS-HCC): ICD-10-CM

## 2023-12-14 DIAGNOSIS — J44.9 CHRONIC OBSTRUCTIVE PULMONARY DISEASE, UNSPECIFIED COPD TYPE (MULTI): ICD-10-CM

## 2023-12-14 DIAGNOSIS — I50.20 HFREF (HEART FAILURE WITH REDUCED EJECTION FRACTION) (MULTI): ICD-10-CM

## 2023-12-14 LAB
MEV IGG SER QL IA: POSITIVE
MUMPS IGG ANTIBODY INDEX: 4.6 IA
MUV IGG SER IA-ACNC: POSITIVE
RUBEOLA IGG ANTIBODY INDEX: >8 IA
RUBV IGG SERPL IA-ACNC: 5.3 IA
RUBV IGG SERPL QL IA: POSITIVE

## 2023-12-14 PROCEDURE — 86317 IMMUNOASSAY INFECTIOUS AGENT: CPT

## 2023-12-14 PROCEDURE — 86765 RUBEOLA ANTIBODY: CPT

## 2023-12-14 PROCEDURE — 1159F MED LIST DOCD IN RCRD: CPT | Performed by: INTERNAL MEDICINE

## 2023-12-14 PROCEDURE — 99212 OFFICE O/P EST SF 10 MIN: CPT | Performed by: INTERNAL MEDICINE

## 2023-12-14 PROCEDURE — 3078F DIAST BP <80 MM HG: CPT | Performed by: INTERNAL MEDICINE

## 2023-12-14 PROCEDURE — 86735 MUMPS ANTIBODY: CPT

## 2023-12-14 PROCEDURE — 1160F RVW MEDS BY RX/DR IN RCRD: CPT | Performed by: INTERNAL MEDICINE

## 2023-12-14 PROCEDURE — 3074F SYST BP LT 130 MM HG: CPT | Performed by: INTERNAL MEDICINE

## 2023-12-14 PROCEDURE — 36415 COLL VENOUS BLD VENIPUNCTURE: CPT

## 2023-12-14 PROCEDURE — 1126F AMNT PAIN NOTED NONE PRSNT: CPT | Performed by: INTERNAL MEDICINE

## 2023-12-14 ASSESSMENT — PATIENT HEALTH QUESTIONNAIRE - PHQ9
2. FEELING DOWN, DEPRESSED OR HOPELESS: NOT AT ALL
1. LITTLE INTEREST OR PLEASURE IN DOING THINGS: NOT AT ALL
SUM OF ALL RESPONSES TO PHQ9 QUESTIONS 1 AND 2: 0

## 2023-12-14 NOTE — PROGRESS NOTES
Patient ID: Dora Palmer is a 83 y.o. female who presents for Follow-up (Patient here for follow-up visit ).    /73   Pulse 66   Wt 71.7 kg (158 lb)   SpO2 97%   BMI 26.29 kg/m²     HPI      Patient is here for follow-up on her blood pressure which is   Well-controlled.  To continue the same blood pressure medication what she is taking      She is also here to fill out her form for  For foster parenting she needs blood work for mom's measles and rubella              Subjective     Review of Systems   Constitutional: Negative.    All other systems reviewed and are negative.      Objective     Physical Exam  Vitals and nursing note reviewed.   Cardiovascular:      Rate and Rhythm: Normal rate and regular rhythm.      Pulses: Normal pulses.      Heart sounds: Normal heart sounds. No murmur heard.  Pulmonary:      Breath sounds: Examination of the right-upper field reveals decreased breath sounds. Examination of the left-upper field reveals decreased breath sounds. Examination of the right-middle field reveals decreased breath sounds. Examination of the left-middle field reveals decreased breath sounds. Examination of the right-lower field reveals decreased breath sounds. Examination of the left-lower field reveals decreased breath sounds. Decreased breath sounds present.         Lab Results   Component Value Date    WBC 3.4 (L) 02/28/2023    HGB 14.4 02/28/2023    HCT 43.8 02/28/2023    MCV 96 02/28/2023     02/28/2023           Problem List Items Addressed This Visit       Anomalous optic nerve (CMS/HCC)     STABLE PT SEE OPHTHALMOLOGY         Atrial fibrillation (CMS/HCC)    Chronic obstructive pulmonary disease (CMS/HCC)    HFrEF (heart failure with reduced ejection fraction) (CMS/HCC)    Hypothyroidism    Hypertension - Primary    Peripheral vascular disease (CMS/HCC)     Other Visit Diagnoses       Immunity status testing        Relevant Orders    Rubella antibody, IgG (Completed)    Measles  ([place]) Antibody, IgG (Completed)    Mumps Antibody, IgG (Completed)             A/P      MMR antibody titer  Form signed

## 2023-12-17 RX ORDER — FUROSEMIDE 40 MG/1
40 TABLET ORAL DAILY
Qty: 100 TABLET | Refills: 1 | Status: SHIPPED | OUTPATIENT
Start: 2023-12-17

## 2023-12-25 ASSESSMENT — ENCOUNTER SYMPTOMS: CONSTITUTIONAL NEGATIVE: 1

## 2024-01-04 ENCOUNTER — OFFICE VISIT (OUTPATIENT)
Dept: OPHTHALMOLOGY | Facility: CLINIC | Age: 84
End: 2024-01-04
Payer: MEDICARE

## 2024-01-04 DIAGNOSIS — H40.009 GLAUCOMA SUSPECT, UNSPECIFIED LATERALITY: ICD-10-CM

## 2024-01-04 DIAGNOSIS — H40.1131 PRIMARY OPEN-ANGLE GLAUCOMA, BILATERAL, MILD STAGE: Primary | ICD-10-CM

## 2024-01-04 DIAGNOSIS — H40.003 PREGLAUCOMA, UNSPECIFIED, BILATERAL: ICD-10-CM

## 2024-01-04 DIAGNOSIS — H53.8 BLURRED VISION: ICD-10-CM

## 2024-01-04 PROCEDURE — 99214 OFFICE O/P EST MOD 30 MIN: CPT | Performed by: OPHTHALMOLOGY

## 2024-01-04 PROCEDURE — 92133 CPTRZD OPH DX IMG PST SGM ON: CPT | Performed by: OPHTHALMOLOGY

## 2024-01-04 PROCEDURE — 92083 EXTENDED VISUAL FIELD XM: CPT | Performed by: OPHTHALMOLOGY

## 2024-01-04 RX ORDER — TIMOLOL MALEATE 5 MG/ML
1 SOLUTION/ DROPS OPHTHALMIC DAILY
Qty: 10 ML | Refills: 11 | Status: SHIPPED | OUTPATIENT
Start: 2024-01-04 | End: 2024-02-08 | Stop reason: WASHOUT

## 2024-01-04 ASSESSMENT — REFRACTION_WEARINGRX
OD_AXIS: 075
OD_SPHERE: -0.75
OS_ADD: +2.50
OD_ADD: +2.50
OS_CYLINDER: -0.50
SPECS_TYPE: BIFOCAL
OD_CYLINDER: -0.50
OS_AXIS: 090
OS_SPHERE: +0.25

## 2024-01-04 ASSESSMENT — CONF VISUAL FIELD
OD_INFERIOR_NASAL_RESTRICTION: 0
OS_INFERIOR_NASAL_RESTRICTION: 0
OD_NORMAL: 1
OS_SUPERIOR_TEMPORAL_RESTRICTION: 0
OD_SUPERIOR_TEMPORAL_RESTRICTION: 0
OS_SUPERIOR_NASAL_RESTRICTION: 0
OD_INFERIOR_TEMPORAL_RESTRICTION: 0
OS_NORMAL: 1
METHOD: COUNTING FINGERS
OD_SUPERIOR_NASAL_RESTRICTION: 0
OS_INFERIOR_TEMPORAL_RESTRICTION: 0

## 2024-01-04 ASSESSMENT — VISUAL ACUITY
CORRECTION_TYPE: GLASSES
METHOD: SNELLEN - LINEAR
OD_CC: 20/20
OS_CC+: +1
OD_CC+: -1
OS_CC: 20/25

## 2024-01-04 ASSESSMENT — EXTERNAL EXAM - LEFT EYE: OS_EXAM: NORMAL

## 2024-01-04 ASSESSMENT — ENCOUNTER SYMPTOMS
NEUROLOGICAL NEGATIVE: 0
CONSTITUTIONAL NEGATIVE: 0
EYES NEGATIVE: 1

## 2024-01-04 ASSESSMENT — TONOMETRY
OD_IOP_MMHG: 16
OS_IOP_MMHG: 15
IOP_METHOD: GOLDMANN APPLANATION

## 2024-01-04 ASSESSMENT — PACHYMETRY
OS_CT(UM): 662
OD_CT(UM): 666

## 2024-01-04 ASSESSMENT — SLIT LAMP EXAM - LIDS: COMMENTS: NORMAL

## 2024-01-04 ASSESSMENT — CUP TO DISC RATIO
OS_RATIO: 0.75
OD_RATIO: 0.6

## 2024-01-04 NOTE — PROGRESS NOTES
Visual Acuity (Snellen - Linear)         Right Left    Dist cc 20/20 -1 20/25 +1      Correction: Glasses          Tonometry       Tonometry (Goldmann Applanation, 10:34 AM)         Right Left    Pressure 16 15                  Assessment/Plan             Visual Acuity (Snellen - Linear)         Right Left    Dist cc 20/25 -2 20/25      Correction: Glasses          Tonometry       Tonometry (Goldmann Applanation, 10:44 AM)         Right Left    Pressure 12 13                  Assessment/Plan   Last dilated:  1/4/24  specular microscopy OD:  2,698    1.  Primary Open-Angle Glaucoma OU:  /662 Tm 18/19.  IOPs should be in the low teens given progression at upper teens.   s/p combined with KDB OD 2/14/23:  pre-op VA 20/60, IOP 18 mmHg.  Toric @ approx 95 degrees and target 97.  s/p combined with KDB OS 5/10/23:  pre-op VA 20/50, IOP 15 mmHg.  HVF OD hints at progression, but RNFL OD is stable.  IOPs are outside of range.  Will advance Rx.     Plan:  cont latanoprost OU QHS               Start timolol OU Qam               f/u 1 months    3.  Pseudophakia (PCIOL - toric) OU:  no visually significant PCO     Plan:  monitor    3.  RLL Nevus:  pt would like removal     Plan:  as per Dr. Gomez

## 2024-02-08 ENCOUNTER — OFFICE VISIT (OUTPATIENT)
Dept: OPHTHALMOLOGY | Facility: CLINIC | Age: 84
End: 2024-02-08
Payer: MEDICARE

## 2024-02-08 ENCOUNTER — TELEPHONE (OUTPATIENT)
Dept: PHARMACY | Facility: HOSPITAL | Age: 84
End: 2024-02-08

## 2024-02-08 DIAGNOSIS — H40.1131 PRIMARY OPEN-ANGLE GLAUCOMA, BILATERAL, MILD STAGE: Primary | ICD-10-CM

## 2024-02-08 PROCEDURE — 99213 OFFICE O/P EST LOW 20 MIN: CPT | Performed by: OPHTHALMOLOGY

## 2024-02-08 RX ORDER — BRIMONIDINE TARTRATE 2 MG/ML
1 SOLUTION/ DROPS OPHTHALMIC 2 TIMES DAILY
Qty: 10 ML | Refills: 11 | Status: SHIPPED | OUTPATIENT
Start: 2024-02-08 | End: 2024-03-14 | Stop reason: ALTCHOICE

## 2024-02-08 ASSESSMENT — TONOMETRY
IOP_METHOD: GOLDMANN APPLANATION
OD_IOP_MMHG: 16
OS_IOP_MMHG: 14

## 2024-02-08 ASSESSMENT — ENCOUNTER SYMPTOMS
MUSCULOSKELETAL NEGATIVE: 0
NEUROLOGICAL NEGATIVE: 0
EYES NEGATIVE: 1
RESPIRATORY NEGATIVE: 0
PSYCHIATRIC NEGATIVE: 0
ALLERGIC/IMMUNOLOGIC NEGATIVE: 0
CONSTITUTIONAL NEGATIVE: 0
ENDOCRINE NEGATIVE: 0
GASTROINTESTINAL NEGATIVE: 0
HEMATOLOGIC/LYMPHATIC NEGATIVE: 0
CARDIOVASCULAR NEGATIVE: 0

## 2024-02-08 ASSESSMENT — VISUAL ACUITY
OD_CC+: -2
CORRECTION_TYPE: GLASSES
METHOD: SNELLEN - LINEAR
OD_CC: 20/25
OS_CC: 20/25

## 2024-02-08 ASSESSMENT — PACHYMETRY
OS_CT(UM): 662
OD_CT(UM): 666

## 2024-02-08 NOTE — PROGRESS NOTES
Visual Acuity (Snellen - Linear)         Right Left    Dist cc 20/25 -2 20/25      Correction: Glasses          Tonometry       Tonometry (Goldmann Applanation, 10:14 AM)         Right Left    Pressure 16 14                    Assessment/Plan   Last dilated:  1/4/24  specular microscopy OD:  2,698    1.  Primary Open-Angle Glaucoma OU:  /662 Tm 18/19.  IOPs should be in the low teens given progression at upper teens.   s/p combined with KDB OD 2/14/23:  pre-op VA 20/60, IOP 18 mmHg.  Toric @ approx 95 degrees and target 97.  s/p combined with KDB OS 5/10/23:  pre-op VA 20/50, IOP 15 mmHg.  HVF OD hints at progression, but RNFL OD is stable.  IOPs are outside of range.  Will advance Rx.  Timolol -> no effect     Plan:  cont latanoprost OU QHS               D/c timolol                Start brimonidine 0.2% OU BID               f/u 1 months    3.  Pseudophakia (PCIOL - toric) OU:  no visually significant PCO     Plan:  monitor    3.  RLL Nevus:  pt would like removal     Plan:  as per Dr. Gomez

## 2024-02-08 NOTE — TELEPHONE ENCOUNTER
Population Health: Outreach by Ambulatory Pharmacy Team    Payor: [employer] [place]  Reason: Adherence  Medication: Losartan    Outcome: No answer    Cristopher Murray, BereD

## 2024-03-14 ENCOUNTER — OFFICE VISIT (OUTPATIENT)
Dept: OPHTHALMOLOGY | Facility: CLINIC | Age: 84
End: 2024-03-14
Payer: MEDICARE

## 2024-03-14 DIAGNOSIS — H40.1131 PRIMARY OPEN-ANGLE GLAUCOMA, BILATERAL, MILD STAGE: Primary | ICD-10-CM

## 2024-03-14 PROBLEM — H40.009 GLAUCOMA SUSPECT: Status: RESOLVED | Noted: 2023-03-04 | Resolved: 2024-03-14

## 2024-03-14 PROCEDURE — 99213 OFFICE O/P EST LOW 20 MIN: CPT | Performed by: OPHTHALMOLOGY

## 2024-03-14 RX ORDER — DORZOLAMIDE HCL 20 MG/ML
1 SOLUTION/ DROPS OPHTHALMIC 2 TIMES DAILY
Qty: 10 ML | Refills: 11 | Status: SHIPPED | OUTPATIENT
Start: 2024-03-14 | End: 2024-04-18 | Stop reason: SDUPTHER

## 2024-03-14 ASSESSMENT — ENCOUNTER SYMPTOMS: EYES NEGATIVE: 1

## 2024-03-14 ASSESSMENT — VISUAL ACUITY
OD_CC: 20/25
METHOD: SNELLEN - LINEAR
OS_CC: 20/25

## 2024-03-14 ASSESSMENT — EXTERNAL EXAM - LEFT EYE: OS_EXAM: NORMAL

## 2024-03-14 ASSESSMENT — PACHYMETRY
OD_CT(UM): 666
OS_CT(UM): 662

## 2024-03-14 ASSESSMENT — TONOMETRY
IOP_METHOD: GOLDMANN APPLANATION
OD_IOP_MMHG: 14
OS_IOP_MMHG: 14

## 2024-03-14 ASSESSMENT — CUP TO DISC RATIO
OS_RATIO: 0.75
OD_RATIO: 0.6

## 2024-03-14 ASSESSMENT — SLIT LAMP EXAM - LIDS: COMMENTS: NORMAL

## 2024-03-14 NOTE — PROGRESS NOTES
Visual Acuity (Snellen - Linear)         Right Left    Dist cc 20/25 20/25          Tonometry       Tonometry (Goldmann Applanation, 9:21 AM)         Right Left    Pressure 14 14                  Assessment/Plan   Last dilated:  1/4/24  specular microscopy OD:  2,698    1.  Primary Open-Angle Glaucoma OU:  /662 Tm 18/19.  IOPs should be in the low teens given progression at upper teens.   s/p combined with KDB OD 2/14/23:  pre-op VA 20/60, IOP 18 mmHg.  Toric @ approx 95 degrees and target 97.  s/p combined with KDB OS 5/10/23:  pre-op VA 20/50, IOP 15 mmHg.  HVF OD hints at progression, but RNFL OD is stable.  IOPs are outside of range.  Will advance Rx.  Timolol -> no effect.  Brimonidine -> 2 mmHg OD, but nothing OS.  Goal for OD is low teens.     Plan:  cont latanoprost OU QHS               D/c brimonidine               Start dorzolamide 0.2% OU BID               f/u 1 months    3.  Pseudophakia (PCIOL - toric) OU:  no visually significant PCO     Plan:  monitor    3.  RLL Nevus:  pt would like removal     Plan:  as per Dr. Gomez

## 2024-03-27 ENCOUNTER — OFFICE VISIT (OUTPATIENT)
Dept: CARDIOLOGY | Facility: HOSPITAL | Age: 84
End: 2024-03-27
Payer: MEDICARE

## 2024-03-27 VITALS
HEART RATE: 63 BPM | BODY MASS INDEX: 25.49 KG/M2 | DIASTOLIC BLOOD PRESSURE: 76 MMHG | HEIGHT: 65 IN | SYSTOLIC BLOOD PRESSURE: 150 MMHG | WEIGHT: 153 LBS

## 2024-03-27 DIAGNOSIS — I48.0 PAROXYSMAL ATRIAL FIBRILLATION (MULTI): Primary | ICD-10-CM

## 2024-03-27 DIAGNOSIS — I50.20 HFREF (HEART FAILURE WITH REDUCED EJECTION FRACTION) (MULTI): ICD-10-CM

## 2024-03-27 DIAGNOSIS — J44.9 CHRONIC OBSTRUCTIVE PULMONARY DISEASE, UNSPECIFIED COPD TYPE (MULTI): ICD-10-CM

## 2024-03-27 DIAGNOSIS — I10 HYPERTENSION, UNSPECIFIED TYPE: ICD-10-CM

## 2024-03-27 DIAGNOSIS — R07.82 INTERCOSTAL PAIN: ICD-10-CM

## 2024-03-27 PROCEDURE — 99214 OFFICE O/P EST MOD 30 MIN: CPT | Performed by: INTERNAL MEDICINE

## 2024-03-27 PROCEDURE — 3077F SYST BP >= 140 MM HG: CPT | Performed by: INTERNAL MEDICINE

## 2024-03-27 PROCEDURE — 93005 ELECTROCARDIOGRAM TRACING: CPT | Performed by: INTERNAL MEDICINE

## 2024-03-27 PROCEDURE — 3078F DIAST BP <80 MM HG: CPT | Performed by: INTERNAL MEDICINE

## 2024-03-27 PROCEDURE — 1159F MED LIST DOCD IN RCRD: CPT | Performed by: INTERNAL MEDICINE

## 2024-03-27 ASSESSMENT — ENCOUNTER SYMPTOMS
DEPRESSION: 0
LOSS OF SENSATION IN FEET: 0

## 2024-03-27 NOTE — PROGRESS NOTES
"Chief Complaint:   Atrial Fibrillation and Hypertension (HF)     History Of Present Illness:    Dora Palmer is a 83 y.o. female here for followup. She was hospitalized late 5/2021 with septic shock and bacteremia in the setting E coli UTI c/b [facility] with subsequent discovery of underlying type II DM (a1c 6.5%), acute heart failure with cardiomyopathy (HFrEF; EF 45%) in setting of sepsis, and new persistent atrial fibrillation. She eventually converted to SR with no documented AF since. She was hospitalized again in October for reported acute HF. It was unclear if she was taking her medications appropriately. She had no noted atrial fibrillation during that encounter. She otherwise has a history of PAD, and COPD.      She reports doing well outside of nocturnal wheezing for which she would like to see pulmonary medicine for. Also notes rare episodes of chest discomfort along her lower left chest near her bra line with episodes lasting several minutes at a time being sharp in character without any provoking factors and with self resolution and no other associated symptoms. She otherwise continues to smoke and notes elevated BP with home checks.          Last Recorded Vitals:  Vitals:    03/27/24 1030 03/27/24 1037   BP: (!) 189/100 150/76   BP Location: Left arm Left arm   Patient Position: Sitting    BP Cuff Size: Adult    Pulse: 63    Weight: 69.4 kg (153 lb)    Height: 1.651 m (5' 5\")    PF: 97 L/min              Allergies:  Patient has no known allergies.    Outpatient Medications:  Current Outpatient Medications   Medication Instructions    albuterol (ProAir HFA) 90 mcg/actuation inhaler 2 puffs, inhalation, Every 4 hours PRN    amLODIPine (NORVASC) 10 mg, oral, Daily, -----DUE FOR APPT    aspirin 81 mg chewable tablet 1 tablet, oral, Daily    atorvastatin (LIPITOR) 20 mg, oral, Daily    atorvastatin (LIPITOR) 40 mg, oral, Daily    cholecalciferol (Vitamin D-3) 50 MCG (2000 UT) tablet 1 tablet, oral, Daily    " dorzolamide (Trusopt) 2 % ophthalmic solution 1 drop, Both Eyes, 2 times daily    esomeprazole (NexIUM) 40 mg DR capsule 1 capsule, oral, Daily    furosemide (LASIX) 40 mg, oral, Daily    latanoprost (Xalatan) 0.005 % ophthalmic solution 1 drop, Left Eye, Nightly    losartan (COZAAR) 100 mg, oral, Daily, ------DUE FOR APPT    metoprolol tartrate (LOPRESSOR) 100 mg, oral, Daily, ----DUE FOR APPT    multivitamin/iron/folic acid (CENTRUM COMPLETE ORAL) 1 tablet, oral, Daily    prazosin (Minipress) 1 mg capsule 1 capsule, oral, 2 times daily    tiotropium (SPIRIVA WITH HANDIHALER) 18 mcg, inhalation, Daily RT         Physical Exam:  Gen Well appearing elderly female sitting up in NAD. Body mass index is 25.46 kg/m².   CV rrr. No m/r/g appreciated. No JVD or leg edema. Pulses 2+ and symmetric.    Pulm Lungs clear with normal respiratory effort.  Neuro Alert and conversant. Grossly nonfocal.       I reviewed the patient's ECG - NSR . PVC's vs. aberrantly conducted supraventricular beats       I reviewed most recent imaging / labs / and office notes    Assessment/Plan   1. Hypertension  BP well controlled at last visit but chronically uncontrolled per her report being uncontrolled with today's visit as well. She does not take Prazosin twice a day and is unsure if she is taking it at all. She will check her medicine cabinet and call us with her regimen and will start taking it BID if she has any. Monitoring. Smoking cessation and exercise encouraged.     2. HFmrEF  History of. EF may have improved but she remains euvolemic and asymptomatic thus no plans for a recheck at this time and will con't her losartan and furosemide indefinitely.      3. Atrial fibrillation  History of. May have been entirely secondary to her sepsis. Her heart monitor did not show any arrhythmia recurrence and she she had no noted occurrences while rehospitalized in October 2021. We elected to not continue anticoagulation after a detailed discussion  (see prior notes). Monitoring for recurrences.     4. Tobacco abuse  Smoking cessation encouraged.     5. Nocturnal wheezing  Pulmonary consult referral placed per patient request.    6. Chest pain  Atypical / non-cardiac by her present description. Monitoring for now. She was instructed to call if her symptomatology changed.        Followup 2 months        Jarod Alexander MD

## 2024-04-06 LAB
ATRIAL RATE: 63 BPM
P AXIS: 81 DEGREES
P OFFSET: 192 MS
P ONSET: 126 MS
PR INTERVAL: 196 MS
Q ONSET: 224 MS
QRS COUNT: 10 BEATS
QRS DURATION: 82 MS
QT INTERVAL: 426 MS
QTC CALCULATION(BAZETT): 435 MS
QTC FREDERICIA: 433 MS
R AXIS: 60 DEGREES
T AXIS: 80 DEGREES
T OFFSET: 437 MS
VENTRICULAR RATE: 63 BPM

## 2024-04-10 ENCOUNTER — PROCEDURE VISIT (OUTPATIENT)
Dept: OBSTETRICS AND GYNECOLOGY | Facility: CLINIC | Age: 84
End: 2024-04-10
Payer: MEDICARE

## 2024-04-10 VITALS
DIASTOLIC BLOOD PRESSURE: 69 MMHG | SYSTOLIC BLOOD PRESSURE: 151 MMHG | WEIGHT: 154 LBS | BODY MASS INDEX: 25.66 KG/M2 | HEIGHT: 65 IN | HEART RATE: 64 BPM

## 2024-04-10 DIAGNOSIS — N81.4 UTEROVAGINAL PROLAPSE: Primary | ICD-10-CM

## 2024-04-10 DIAGNOSIS — Z46.89 PESSARY MAINTENANCE: ICD-10-CM

## 2024-04-10 DIAGNOSIS — N39.3 SUI (STRESS URINARY INCONTINENCE, FEMALE): ICD-10-CM

## 2024-04-10 PROCEDURE — 99213 OFFICE O/P EST LOW 20 MIN: CPT | Performed by: NURSE PRACTITIONER

## 2024-04-10 ASSESSMENT — PAIN SCALES - GENERAL: PAINLEVEL: 0-NO PAIN

## 2024-04-16 ENCOUNTER — LAB (OUTPATIENT)
Dept: LAB | Facility: LAB | Age: 84
End: 2024-04-16
Payer: MEDICARE

## 2024-04-16 ENCOUNTER — OFFICE VISIT (OUTPATIENT)
Dept: PRIMARY CARE | Facility: CLINIC | Age: 84
End: 2024-04-16
Payer: MEDICARE

## 2024-04-16 VITALS
DIASTOLIC BLOOD PRESSURE: 78 MMHG | BODY MASS INDEX: 25.86 KG/M2 | WEIGHT: 155.2 LBS | SYSTOLIC BLOOD PRESSURE: 135 MMHG | HEART RATE: 60 BPM | OXYGEN SATURATION: 96 % | HEIGHT: 65 IN

## 2024-04-16 DIAGNOSIS — I50.33 ACUTE ON CHRONIC DIASTOLIC (CONGESTIVE) HEART FAILURE (MULTI): ICD-10-CM

## 2024-04-16 DIAGNOSIS — Z00.00 MEDICARE ANNUAL WELLNESS VISIT, SUBSEQUENT: Primary | Chronic | ICD-10-CM

## 2024-04-16 DIAGNOSIS — J44.9 CHRONIC OBSTRUCTIVE PULMONARY DISEASE, UNSPECIFIED COPD TYPE (MULTI): ICD-10-CM

## 2024-04-16 DIAGNOSIS — E78.5 HYPERLIPIDEMIA, UNSPECIFIED HYPERLIPIDEMIA TYPE: ICD-10-CM

## 2024-04-16 DIAGNOSIS — Q07.8 ANOMALOUS OPTIC NERVE (MULTI): ICD-10-CM

## 2024-04-16 DIAGNOSIS — I48.0 PAROXYSMAL ATRIAL FIBRILLATION (MULTI): ICD-10-CM

## 2024-04-16 DIAGNOSIS — E11.51 TYPE 2 DIABETES MELLITUS WITH DIABETIC PERIPHERAL ANGIOPATHY WITHOUT GANGRENE, WITHOUT LONG-TERM CURRENT USE OF INSULIN (MULTI): ICD-10-CM

## 2024-04-16 DIAGNOSIS — I73.9 PERIPHERAL VASCULAR DISEASE (CMS-HCC): ICD-10-CM

## 2024-04-16 DIAGNOSIS — I10 HYPERTENSION, UNSPECIFIED TYPE: ICD-10-CM

## 2024-04-16 DIAGNOSIS — E27.8 ADRENAL MASS (MULTI): ICD-10-CM

## 2024-04-16 DIAGNOSIS — I50.20 HFREF (HEART FAILURE WITH REDUCED EJECTION FRACTION) (MULTI): ICD-10-CM

## 2024-04-16 DIAGNOSIS — N18.31 STAGE 3A CHRONIC KIDNEY DISEASE (MULTI): ICD-10-CM

## 2024-04-16 DIAGNOSIS — R09.89 BRUIT OF LEFT CAROTID ARTERY: ICD-10-CM

## 2024-04-16 DIAGNOSIS — E03.9 HYPOTHYROIDISM, UNSPECIFIED TYPE: ICD-10-CM

## 2024-04-16 LAB
CREAT UR-MCNC: 102.6 MG/DL (ref 20–320)
MICROALBUMIN UR-MCNC: 72.7 MG/L
MICROALBUMIN/CREAT UR: 70.9 UG/MG CREAT

## 2024-04-16 PROCEDURE — 1170F FXNL STATUS ASSESSED: CPT | Performed by: INTERNAL MEDICINE

## 2024-04-16 PROCEDURE — 1160F RVW MEDS BY RX/DR IN RCRD: CPT | Performed by: INTERNAL MEDICINE

## 2024-04-16 PROCEDURE — 83036 HEMOGLOBIN GLYCOSYLATED A1C: CPT

## 2024-04-16 PROCEDURE — 36415 COLL VENOUS BLD VENIPUNCTURE: CPT

## 2024-04-16 PROCEDURE — 3075F SYST BP GE 130 - 139MM HG: CPT | Performed by: INTERNAL MEDICINE

## 2024-04-16 PROCEDURE — 1159F MED LIST DOCD IN RCRD: CPT | Performed by: INTERNAL MEDICINE

## 2024-04-16 PROCEDURE — 82043 UR ALBUMIN QUANTITATIVE: CPT

## 2024-04-16 PROCEDURE — 80053 COMPREHEN METABOLIC PANEL: CPT

## 2024-04-16 PROCEDURE — 3078F DIAST BP <80 MM HG: CPT | Performed by: INTERNAL MEDICINE

## 2024-04-16 PROCEDURE — G0439 PPPS, SUBSEQ VISIT: HCPCS | Performed by: INTERNAL MEDICINE

## 2024-04-16 PROCEDURE — 80061 LIPID PANEL: CPT

## 2024-04-16 PROCEDURE — 82570 ASSAY OF URINE CREATININE: CPT

## 2024-04-16 ASSESSMENT — ENCOUNTER SYMPTOMS: CONSTITUTIONAL NEGATIVE: 1

## 2024-04-16 ASSESSMENT — PATIENT HEALTH QUESTIONNAIRE - PHQ9
2. FEELING DOWN, DEPRESSED OR HOPELESS: NOT AT ALL
SUM OF ALL RESPONSES TO PHQ9 QUESTIONS 1 AND 2: 0
1. LITTLE INTEREST OR PLEASURE IN DOING THINGS: NOT AT ALL

## 2024-04-16 ASSESSMENT — ACTIVITIES OF DAILY LIVING (ADL)
DOING_HOUSEWORK: INDEPENDENT
TAKING_MEDICATION: INDEPENDENT
GROCERY_SHOPPING: INDEPENDENT
MANAGING_FINANCES: INDEPENDENT
DRESSING: INDEPENDENT
BATHING: INDEPENDENT

## 2024-04-16 NOTE — PROGRESS NOTES
"Patient ID: Dora Palmer is a 83 y.o. female who presents for Medicare Annual Wellness Visit Subsequent.    /78   Pulse 60   Ht 1.651 m (5' 5\")   Wt 70.4 kg (155 lb 3.2 oz)   SpO2 96%   BMI 25.83 kg/m²     HPI        Htn on medications controlled   No CHEST PAIN , NO PND, NO ORTHOPNEA  NO LEG SWELLING , NO HEADACHE   NO SYNCOPE , NO PALPITATION           ATRIAL FIBRILLATION STABLE   NO SYNCOPE, NO PALPITATION   NO SOB       CKD STAGE 3A STABLE         DIABETES WITH PERIPHERAL ANGIIOPATHY   STABLE      COPD STABLE   OCCASIONALLY COUGH   OCCASIONALLY WHEEZING   OCCASIONALLY SOB      ADRENAL MASS STABLE   BENIGN , NO FOLLOW UP NEEDED       CHF STABLE   NO ORTHOPNEA, NO PND   NO LEG SWELLING     Subjective     Review of Systems   Constitutional: Negative.    All other systems reviewed and are negative.      Objective     Physical Exam  Vitals and nursing note reviewed.   Cardiovascular:      Rate and Rhythm: Normal rate and regular rhythm.      Pulses: Normal pulses.      Heart sounds: Normal heart sounds.   Pulmonary:      Breath sounds: Examination of the right-upper field reveals decreased breath sounds. Examination of the left-upper field reveals decreased breath sounds. Examination of the right-middle field reveals decreased breath sounds. Examination of the left-middle field reveals decreased breath sounds. Examination of the right-lower field reveals decreased breath sounds. Examination of the left-lower field reveals decreased breath sounds. Decreased breath sounds present. No wheezing.   Musculoskeletal:      Right lower leg: No edema.      Left lower leg: No edema.   Neurological:      General: No focal deficit present.      Mental Status: She is oriented to person, place, and time. Mental status is at baseline.   Psychiatric:         Mood and Affect: Mood normal.         Behavior: Behavior normal.         Thought Content: Thought content normal.         Judgment: Judgment normal.         Lab " Results   Component Value Date    WBC 3.4 (L) 02/28/2023    HGB 14.4 02/28/2023    HCT 43.8 02/28/2023    MCV 96 02/28/2023     02/28/2023           Problem List Items Addressed This Visit       Adrenal mass (Multi)     stable         Anomalous optic nerve (Multi)     Stable            Atrial fibrillation (Multi)    Chronic obstructive pulmonary disease (Multi)    HFrEF (heart failure with reduced ejection fraction) (Multi)    Hypothyroidism    Hypertension    Peripheral vascular disease (CMS-HCC)     stable         Bruit of left carotid artery    Chronic kidney disease (CKD), stage III (moderate) (Multi)     stable         Acute on chronic diastolic (congestive) heart failure (Multi)     Stable          Type 2 diabetes mellitus with diabetic peripheral angiopathy without gangrene, without long-term current use of insulin (Multi)     stable          Other Visit Diagnoses       Medicare annual wellness visit, subsequent  (Chronic)   -  Primary                 A/P      CBC,CMP,LIPID,A1C , URINE MICROALBUMIN   Continue the same medicatons   Advised to walk more inorder to build up leg circulation               Advance Care Planning Note     Discussion Date: 04/16/24   Discussion Participants: patient    The patient wishes to discuss Advance Care Planning today and the following is a brief summary of our discussion.     Patient has capacity to make their own medical decisions: Yes  Health Care Agent/Surrogate Decision Maker documented in chart: No    Documents on file and valid:  Advance Directive/Living Will: No   Health Care Power of : No  Other:     Communication of Medical Status/Prognosis:        Communication of Treatment Goals/Options:       Discussed with the patient end-of-life choices patient is DNR if she is terminally ill or sick with poor or bad outcome she does have a living will but I could not see it in the chart that means she needs to bring that living will on next office visit so that  it can be scanned into the chart for the purpose of documentation    Treatment Decisions  Goals of Care: quality of life is prioritized; willing to accept low-burden treatments to achieve meaningful goals       Follow Up Plan      Team Members      Time Statement: Total face to face time spent on advance care planning was 5 minutes with 5 minutes spent in counseling, including the explanation.    Jeremy Huynh MD  4/16/2024 2:48 PM

## 2024-04-17 LAB
ALBUMIN SERPL BCP-MCNC: 4.2 G/DL (ref 3.4–5)
ALP SERPL-CCNC: 67 U/L (ref 33–136)
ALT SERPL W P-5'-P-CCNC: 13 U/L (ref 7–45)
ANION GAP SERPL CALC-SCNC: 16 MMOL/L (ref 10–20)
AST SERPL W P-5'-P-CCNC: 20 U/L (ref 9–39)
BILIRUB SERPL-MCNC: 0.8 MG/DL (ref 0–1.2)
BUN SERPL-MCNC: 15 MG/DL (ref 6–23)
CALCIUM SERPL-MCNC: 9.4 MG/DL (ref 8.6–10.6)
CHLORIDE SERPL-SCNC: 102 MMOL/L (ref 98–107)
CHOLEST SERPL-MCNC: 156 MG/DL (ref 0–199)
CHOLESTEROL/HDL RATIO: 2.5
CO2 SERPL-SCNC: 24 MMOL/L (ref 21–32)
CREAT SERPL-MCNC: 1.02 MG/DL (ref 0.5–1.05)
EGFRCR SERPLBLD CKD-EPI 2021: 55 ML/MIN/1.73M*2
EST. AVERAGE GLUCOSE BLD GHB EST-MCNC: 154 MG/DL
GLUCOSE SERPL-MCNC: 85 MG/DL (ref 74–99)
HBA1C MFR BLD: 7 %
HDLC SERPL-MCNC: 63.2 MG/DL
LDLC SERPL CALC-MCNC: 76 MG/DL
NON HDL CHOLESTEROL: 93 MG/DL (ref 0–149)
POTASSIUM SERPL-SCNC: 4 MMOL/L (ref 3.5–5.3)
PROT SERPL-MCNC: 6.8 G/DL (ref 6.4–8.2)
SODIUM SERPL-SCNC: 138 MMOL/L (ref 136–145)
TRIGL SERPL-MCNC: 83 MG/DL (ref 0–149)
VLDL: 17 MG/DL (ref 0–40)

## 2024-04-18 ENCOUNTER — OFFICE VISIT (OUTPATIENT)
Dept: OPHTHALMOLOGY | Facility: CLINIC | Age: 84
End: 2024-04-18
Payer: MEDICARE

## 2024-04-18 DIAGNOSIS — Z96.1 PSEUDOPHAKIA OF BOTH EYES: ICD-10-CM

## 2024-04-18 DIAGNOSIS — H40.1131 PRIMARY OPEN-ANGLE GLAUCOMA, BILATERAL, MILD STAGE: Primary | ICD-10-CM

## 2024-04-18 PROCEDURE — 99213 OFFICE O/P EST LOW 20 MIN: CPT | Performed by: OPHTHALMOLOGY

## 2024-04-18 RX ORDER — DORZOLAMIDE HCL 20 MG/ML
1 SOLUTION/ DROPS OPHTHALMIC 2 TIMES DAILY
Qty: 30 ML | Refills: 3 | Status: SHIPPED | OUTPATIENT
Start: 2024-04-18 | End: 2025-04-18

## 2024-04-18 ASSESSMENT — SLIT LAMP EXAM - LIDS: COMMENTS: NORMAL

## 2024-04-18 ASSESSMENT — TONOMETRY
OS_IOP_MMHG: 10
IOP_METHOD: GOLDMANN APPLANATION
OD_IOP_MMHG: 12

## 2024-04-18 ASSESSMENT — ENCOUNTER SYMPTOMS
RESPIRATORY NEGATIVE: 0
EYES NEGATIVE: 1
ALLERGIC/IMMUNOLOGIC NEGATIVE: 0
CARDIOVASCULAR NEGATIVE: 0
MUSCULOSKELETAL NEGATIVE: 0
NEUROLOGICAL NEGATIVE: 0
ENDOCRINE NEGATIVE: 0
CONSTITUTIONAL NEGATIVE: 0
PSYCHIATRIC NEGATIVE: 0
HEMATOLOGIC/LYMPHATIC NEGATIVE: 0
GASTROINTESTINAL NEGATIVE: 0

## 2024-04-18 ASSESSMENT — EXTERNAL EXAM - LEFT EYE: OS_EXAM: NORMAL

## 2024-04-18 ASSESSMENT — VISUAL ACUITY
OD_CC+: -2
OS_CC+: -1
METHOD: SNELLEN - LINEAR
OD_CC: 20/25
OS_CC: 20/25

## 2024-04-18 ASSESSMENT — PACHYMETRY
OS_CT(UM): 662
OD_CT(UM): 666

## 2024-04-18 ASSESSMENT — CUP TO DISC RATIO
OD_RATIO: 0.6
OS_RATIO: 0.75

## 2024-04-18 NOTE — PROGRESS NOTES
Visual Acuity (Snellen - Linear)         Right Left    Dist cc 20/25 -2 20/25 -1          Tonometry       Tonometry (Goldmann Applanation, 8:51 AM)         Right Left    Pressure 12 10                  Assessment/Plan   Last dilated:  1/4/24  specular microscopy OD:  2,698    1.  Primary Open-Angle Glaucoma OU:  /662 Tm 18/19.  IOPs should be in the low teens given progression at upper teens.   s/p combined with KDB OD 2/14/23:  pre-op VA 20/60, IOP 18 mmHg.  Toric @ approx 95 degrees and target 97.  s/p combined with KDB OS 5/10/23:  pre-op VA 20/50, IOP 15 mmHg.  HVF OD hints at progression, but RNFL OD is stable.  IOPs are outside of range.  Will advance Rx.  Timolol -> no effect.  Brimonidine -> 2 mmHg OD, but nothing OS.  Goal for OD is low teens.  IOP now well controlled.     Plan:  cont latanoprost OU QHS               cont dorzolamide 0.2% OU BID               f/u 4 months    2.  Pseudophakia (PCIOL - toric) OU:  no visually significant PCO     Plan:  monitor    3.  RLL Nevus:  pt would like removal     Plan:  as per Dr. Gomez

## 2024-04-23 ENCOUNTER — TELEPHONE (OUTPATIENT)
Dept: PRIMARY CARE | Facility: CLINIC | Age: 84
End: 2024-04-23

## 2024-04-23 DIAGNOSIS — E11.9 DIABETES MELLITUS TYPE 2 IN NONOBESE (MULTI): Primary | ICD-10-CM

## 2024-04-23 DIAGNOSIS — E11.9 TYPE 2 DIABETES MELLITUS WITHOUT COMPLICATION, WITHOUT LONG-TERM CURRENT USE OF INSULIN (MULTI): Primary | ICD-10-CM

## 2024-04-23 NOTE — TELEPHONE ENCOUNTER
Pt. Has question about her lab results. It was stated that her diabetes was reasonably controlled. She states that she knew she was borderline diabetic but never knew she was really diagnosed with it. Now she is wondering what she should do, asking if she should follow up with you, or go on a special diet, or repeat her blood work? Please advise.

## 2024-04-23 NOTE — PROGRESS NOTES
[facility] Department of Urogynecology   JACQUE Butts  [phone]    ASSESSMENT AND PLAN:   83 y.o. female being assessed for pessary insertion. Co morbidities: Anemia, Diabetes, HTN, Hypokalemia, Osteopenia.            Diagnoses:   #1 Uterine prolapse     Plan:   1. Uterine prolapse    - #3 ring with support and knob was placed back today as patient has been having discomfort with the pessary out.   - RTC if patient has any issues or discomfort with the pessary in place.      Follow-up in 3 months with JACQUE Butts.     Scribe Attestation:   IKathie, am scribing for virtually, and in the presence of JACQUE Butts on 4/23/24 at 1:08 PM.    I, JACQUE Butts, personally performed the services described in this documentation which was scribed virtually and I confirm that it is both accurate and complete.     JACQUE Butts    Established    HISTORY OF PRESENT ILLNESS:     Dora Palmer is a 83 y.o. female who presents for Pessary insertion. Last seen on 7/19/23 and at that time she wanted her pessary out.     Prolapse Symptoms:  - In the last 4-5 weeks, she feels her POP has gotten worse.   - Denies bleeding.   - She has been urinating more frequently and has nighttime wake ups.   - Tried to push the bulge back up due to discomfort.   - Patient has L sided discomfort and position changing helps that.       Past Medical History:     - Recently had cataract removal and mole removal near eye. Now she has been diagnosed with glaucoma.   - Patient is trying to work on her BP and is on medication currently.   Past Medical History:   Diagnosis Date    Glaucoma     Personal history of other diseases of the circulatory system     History of hypertension    Personal history of other diseases of the nervous system and sense organs     History of glaucoma    Personal history of other endocrine, nutritional and metabolic disease     History  "of thyroid disorder    Pseudophakia, both eyes     Radiculopathy, lumbar region 09/13/2016    Lumbar radiculopathy    Supraventricular tachycardia (CMS-HCC)     Supraventricular tachycardia    Unsteadiness on feet 07/08/2020    Unsteadiness          Past Surgical History:     Past Surgical History:   Procedure Laterality Date    CATARACT EXTRACTION Bilateral     toric lenses    COLONOSCOPY  05/29/2013    Complete Colonoscopy    OTHER SURGICAL HISTORY  05/29/2013    Wrist Surgery    TONSILLECTOMY  06/03/2014    Tonsillectomy        ROS  Review of Systems     PHYSICAL EXAM:    /69   Pulse 64   Ht 1.651 m (5' 5\")   Wt 69.9 kg (154 lb)   BMI 25.63 kg/m²   No LMP recorded. Patient is postmenopausal.    Well developed, well nourished, in no apparent distress.   Neurologic/Psychiatric:  Awake, Alert and Oriented times 3.  Affect normal.     GENITAL/URINARY:     External Genitalia:  The patient has normal appearing external genitalia.     Urethral Meatus:  Size normal, Location normal, Lesions absent, Prolapse absent.    Urethra:  Fullness absent, Masses absent.    Vagina:  General appearance normal.     Physical Exam  Genitourinary:      Genitourinary Comments: Pelvic floor muscles were tender on exam.        Patient ID: Dora Palmer is a 83 y.o. female.    Pessary    Date/Time: 4/23/2024 1:09 PM    Performed by: JACQUE Butts  Authorized by: JACQUE Butts    Consent:     Consent given by:  Patient  Indication:     Indication for pessary: uterine prolapse    Procedure:     Pessary type: ring with support and knob.    Pessary size:  3  Outcomes:     Patient tolerance of procedure:  Tolerated well, no immediate complications  Comments:     Procedure comments:  Pessary was placed today without issue. It was comfortable in place.       "

## 2024-04-29 DIAGNOSIS — I73.9 PVD (PERIPHERAL VASCULAR DISEASE) (CMS-HCC): ICD-10-CM

## 2024-04-29 DIAGNOSIS — I10 HYPERTENSION, UNSPECIFIED TYPE: Primary | ICD-10-CM

## 2024-04-29 DIAGNOSIS — E78.5 HYPERLIPIDEMIA, UNSPECIFIED HYPERLIPIDEMIA TYPE: ICD-10-CM

## 2024-04-30 RX ORDER — PRAZOSIN HYDROCHLORIDE 1 MG/1
1 CAPSULE ORAL 2 TIMES DAILY
Qty: 200 CAPSULE | Refills: 2 | Status: SHIPPED | OUTPATIENT
Start: 2024-04-30

## 2024-05-01 RX ORDER — ATORVASTATIN CALCIUM 40 MG/1
40 TABLET, FILM COATED ORAL DAILY
Qty: 100 TABLET | Refills: 3 | Status: SHIPPED | OUTPATIENT
Start: 2024-05-01

## 2024-06-03 DIAGNOSIS — H40.1131 PRIMARY OPEN-ANGLE GLAUCOMA, BILATERAL, MILD STAGE: ICD-10-CM

## 2024-06-03 RX ORDER — LATANOPROST 50 UG/ML
1 SOLUTION/ DROPS OPHTHALMIC NIGHTLY
Qty: 7.5 ML | Refills: 3 | Status: SHIPPED | OUTPATIENT
Start: 2024-06-03

## 2024-06-06 ENCOUNTER — APPOINTMENT (OUTPATIENT)
Dept: PULMONOLOGY | Facility: CLINIC | Age: 84
End: 2024-06-06
Payer: MEDICARE

## 2024-06-06 ENCOUNTER — OFFICE VISIT (OUTPATIENT)
Dept: CARDIOLOGY | Facility: HOSPITAL | Age: 84
End: 2024-06-06
Payer: MEDICARE

## 2024-06-06 VITALS
DIASTOLIC BLOOD PRESSURE: 86 MMHG | HEIGHT: 65 IN | HEART RATE: 80 BPM | BODY MASS INDEX: 25.89 KG/M2 | SYSTOLIC BLOOD PRESSURE: 169 MMHG | WEIGHT: 155.4 LBS | OXYGEN SATURATION: 97 %

## 2024-06-06 DIAGNOSIS — I48.0 PAROXYSMAL ATRIAL FIBRILLATION (MULTI): Primary | ICD-10-CM

## 2024-06-06 DIAGNOSIS — I10 PRIMARY HYPERTENSION: ICD-10-CM

## 2024-06-06 DIAGNOSIS — Z72.0 TOBACCO ABUSE: ICD-10-CM

## 2024-06-06 DIAGNOSIS — I50.20 HFREF (HEART FAILURE WITH REDUCED EJECTION FRACTION) (MULTI): ICD-10-CM

## 2024-06-06 PROBLEM — I50.33 ACUTE ON CHRONIC DIASTOLIC (CONGESTIVE) HEART FAILURE (MULTI): Status: RESOLVED | Noted: 2024-04-16 | Resolved: 2024-06-06

## 2024-06-06 PROCEDURE — 93005 ELECTROCARDIOGRAM TRACING: CPT | Performed by: INTERNAL MEDICINE

## 2024-06-06 PROCEDURE — 1159F MED LIST DOCD IN RCRD: CPT | Performed by: INTERNAL MEDICINE

## 2024-06-06 PROCEDURE — 3077F SYST BP >= 140 MM HG: CPT | Performed by: INTERNAL MEDICINE

## 2024-06-06 PROCEDURE — 3079F DIAST BP 80-89 MM HG: CPT | Performed by: INTERNAL MEDICINE

## 2024-06-06 PROCEDURE — 99214 OFFICE O/P EST MOD 30 MIN: CPT | Performed by: INTERNAL MEDICINE

## 2024-06-06 RX ORDER — CARVEDILOL 25 MG/1
25 TABLET ORAL
Qty: 180 TABLET | Refills: 3 | Status: SHIPPED | OUTPATIENT
Start: 2024-06-06 | End: 2025-06-06

## 2024-06-06 NOTE — PROGRESS NOTES
"Chief Complaint:   Atrial Fibrillation, Heart Failure, Hypertension, and Peripheral Vascular Disease     History Of Present Illness:    Dora Palmer is a 83 y.o. female here for followup. She was hospitalized late 5/2021 with septic shock and bacteremia in the setting E coli UTI c/b [facility] with subsequent discovery of underlying type II DM (a1c 6.5%), acute heart failure with cardiomyopathy (HFrEF; EF 45%) in setting of sepsis, and new persistent atrial fibrillation. She eventually converted to SR with no documented AF since. She was hospitalized again in October for reported acute HF. It was unclear if she was taking her medications appropriately. She had no noted atrial fibrillation during that encounter. She otherwise has a history of PAD, and COPD.      She reports doing well. Reports BP's in the 130's to 170's with home checks. Did not take her medications this a.m. but is usually compliant per her report. Has been exercising on her bike daily for roughly 10 minutes at a time. She otherwise continues to smoke.          Last Recorded Vitals:  Vitals:    06/06/24 1520   BP: 169/86   BP Location: Left arm   Patient Position: Sitting   Pulse: 80   SpO2: 97%   Weight: 70.5 kg (155 lb 6.4 oz)   Height: 1.651 m (5' 5\")               Allergies:  Patient has no known allergies.    Outpatient Medications:  Current Outpatient Medications   Medication Instructions    albuterol (ProAir HFA) 90 mcg/actuation inhaler 2 puffs, inhalation, Every 4 hours PRN    amLODIPine (NORVASC) 10 mg, oral, Daily, -----DUE FOR APPT    aspirin 81 mg chewable tablet 1 tablet, oral, Daily    atorvastatin (LIPITOR) 40 mg, oral, Daily    carvedilol (COREG) 25 mg, oral, 2 times daily (morning and late afternoon)    cholecalciferol (Vitamin D-3) 50 MCG (2000 UT) tablet 1 tablet, oral, Daily    dorzolamide (Trusopt) 2 % ophthalmic solution 1 drop, Both Eyes, 2 times daily    esomeprazole (NexIUM) 40 mg DR capsule 1 capsule, oral, Daily    furosemide " (LASIX) 40 mg, oral, Daily    latanoprost (Xalatan) 0.005 % ophthalmic solution 1 drop, Both Eyes, Nightly    losartan (COZAAR) 100 mg, oral, Daily, ------DUE FOR APPT    multivitamin/iron/folic acid (CENTRUM COMPLETE ORAL) 1 tablet, oral, Daily    prazosin (MINIPRESS) 1 mg, oral, 2 times daily    tiotropium (SPIRIVA WITH HANDIHALER) 18 mcg, inhalation, Daily RT         Physical Exam:  Gen Well appearing elderly female sitting up in NAD. Body mass index is 25.86 kg/m².   CV rrr. No m/r/g appreciated. No JVD or leg edema.    Pulm Lungs clear with normal respiratory effort.  Neuro Alert and conversant. Grossly nonfocal.       I reviewed the patient's ECG - NSR . PVC's vs. aberrantly conducted supraventricular beats       I reviewed most recent imaging / labs / and office notes    Assessment/Plan   1. Hypertension  BP uncontrolled. Will switch Lopressor to Coreg. Monitoring. Low threshold for increased prazosin next visit if her pressures remain uncontrolled. Smoking cessation and continued exercise encouraged.     2. HFmrEF  History of. EF may have improved but she remains euvolemic and asymptomatic thus no plans for a recheck at this time and will con't her losartan and furosemide indefinitely. Lopressor to Coreg as above.     3. Atrial fibrillation  History of. May have been entirely secondary to her sepsis. Her heart monitor did not show any arrhythmia recurrence and she she had no noted occurrences while rehospitalized in October 2021. We elected to not continue anticoagulation after a detailed discussion (see prior notes). Monitoring for recurrences.     4. Tobacco abuse  Smoking cessation encouraged.            Followup 3 months        Jarod Alexander MD

## 2024-06-07 LAB
ATRIAL RATE: 77 BPM
P AXIS: 55 DEGREES
P OFFSET: 188 MS
P ONSET: 126 MS
PR INTERVAL: 192 MS
Q ONSET: 222 MS
QRS COUNT: 13 BEATS
QRS DURATION: 80 MS
QT INTERVAL: 430 MS
QTC CALCULATION(BAZETT): 486 MS
QTC FREDERICIA: 467 MS
R AXIS: 5 DEGREES
T AXIS: 30 DEGREES
T OFFSET: 437 MS
VENTRICULAR RATE: 77 BPM

## 2024-06-26 ENCOUNTER — TELEPHONE (OUTPATIENT)
Dept: PRIMARY CARE | Facility: CLINIC | Age: 84
End: 2024-06-26
Payer: MEDICARE

## 2024-06-26 NOTE — TELEPHONE ENCOUNTER
Called pt and told her you are advising her to go to urgent to be evaluated she said she will go there today

## 2024-06-30 NOTE — PROGRESS NOTES
Reason for Nutrition Visit:  Pt is a 83 y.o. female referred for   1. Type 2 diabetes mellitus with diabetic peripheral angiopathy without gangrene, without long-term current use of insulin (Multi)           Pt was referred by Dr. Jeremy Huynh on 4/23/24.      Past Medical Hx:  Patient Active Problem List   Diagnosis    Abnormal blood sugar    Abnormal facial hair    Acute renal failure (CMS-HCC)    Adrenal mass (Multi)    Age-related nuclear cataract    Anemia    Anomalous optic nerve (Multi)    Astigmatism, bilateral    Atrial fibrillation (Multi)    Bilateral myopia    Bilateral presbyopia    Chest pain, atypical    Chronic obstructive pulmonary disease (Multi)    Colon polyps    Cortical age-related cataract of both eyes    Diabetes (Multi)    GI bleed    Headache    HFrEF (heart failure with reduced ejection fraction) (Multi)    Cushing's syndrome (Multi)    Hypothyroidism    Leg cramps    Low back pain    Nuclear sclerosis of both eyes    Osteoarthritis of hip    Osteoarthritis of left knee    Osteoarthritis of right knee    Osteopenia    Palpitation    Hypertension    Peripheral vascular disease (CMS-HCC)    Recurrent UTI    Right knee pain    Sensorineural hearing loss, bilateral    Skin rash    Subepithelial mass of esophagus    Urge and stress incontinence    Uterus prolapse    UTI (urinary tract infection)    Vaginal atrophy    Hypokalemia    Bruit of left carotid artery    Chronic kidney disease (CKD), stage III (moderate) (Multi)    Intercostal pain    Type 2 diabetes mellitus with diabetic peripheral angiopathy without gangrene, without long-term current use of insulin (Multi)    Tobacco abuse        Food and Nutrition Hx:  Her grandson lives with her. 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       24 Diet Recall:  Wake: 4:15 - 5 am, at  7:45 am  Meal 1:   11:30 - 12 pm - back at home - cereal w/banana, whole milk or 2 epstein, 1 eggs and 1 toast  Meal 2:  5:30 - 6 pm - pork chops,  vegetable, starch  Meal 3:  Snacks:  cheese curls,trail mix -w/dried mix or ice cream, she does like sweets, usually a cookie after dinner  Beverages:  coffee, water - 16 oz,  soda - rarely   BED: in bed at 9 pm, 1 -2 am     Weight change:    Significant Weight Change: No  CW: (6/6/23) 155.4#  BMI: 25.9  Wt HX:  UBW:  Today's weight: 150.5#, other clinic weight  of 141# from today is not accurate    Lab Results   Component Value Date    HGBA1C 7.0 (H) 04/16/2024    CHOL 156 04/16/2024    LDLF 101 (H) 07/25/2020    TRIG 83 04/16/2024    BUN 15 04/16/2024    CREATININEU 998 02/24/2022          Food Preparation: Patient  Cooking Skills/Barriers: None reported  Grocery Shopping: Patient        Allergies: None  Intolerance: None  Appetite: Normal  two meals and misses lunch  Intake: >75%  GI Symptoms : None   Swallowing Difficulty: No problems with swallowing  D[employer] : own    Eating Out Type: Restaurant  2 times per month  Convenience Foods: Denies     Types of Activities: Active Job and Bike Riding, has back pain, suggested she take back pain  Duration: <30 minutes daily    Sleep duration/quality : 1-4 hours and 5-6 hours. She owned a day care and would work from 6 am to midnight  Sleep disorders: none    Supplements: Multivitamin and Vitamin D daily      Nutrition Focused Physical Exam:    Performed/Deferred: Deferred as pt visually appears well-nourished with no signs of malnutrition      Malnutrition Present: No        Nutrition Diagnosis:    Diagnosis Statement 1:  Diagnosis Status: New  Diagnosis : Altered nutrition related lab values  related to organ dysfunction that leads to biochemical changs as evidenced by  elevated A1c of 7% up from 6.4%    Diagnosis Statement 2:  Diagnosis Status: New  Diagnosis : Food and nutrition related knowledge deficit related to lack of or limited prior nutrition-related education as evidenced by  diet recall, need for a diabetic diet      Nutrition Interventions:    1) Reviewed  diabetic diet including carbohydrate-containing food, appropriate carbs per meal as well as appropriate portion sizes. We discussed that 1 serving of a carbohydrate is equal to 15 gram and patient should consume no more than 3-4 servings per meal for weight maintenance or 2-3 per meal for weight loss. Education materials were provided and meal and snack options discussed. Aim for A1c <7%    2)     We reviewed the importance of having consistent meals and snacks throughout the day to improve or maintain good glycemic control and to increase metabolism to aid with weight loss. Pt should aim to consume a meal or snack every 3-4 hours which contains a lean protein (lean chicken, turkey, fish, eggs, low fat yogurt, nuts, peanut butter, bean) and healthy starch (fruits, whole grains). We discussed having a meal before she leaves for work - healthy options discussed    3) We reviewed the food plate method to help improve healthy eating habits. We discussed that half of the plate should contain 2 non-starchy vegetables (all vegetable besides peas, corn and potatoes), 1/4 of the plate should contain lean protein and 1/4 of the plate should contain a healthy starch.    4) We reviewed the importance and compliance with a 2 gm sodium diet. Recommend decreasing high sodium foods such as soups, gravies, regular deli meats, condiments, prepackaged foods, crackers, chips.  One teaspoon of salt contain more than 2000 mg of sodium.  When reviewing a food label, choose foods than have less than 20% of the daily value of sodium       Nutrition Goals:  Nutrition Goals : Compliance with heart healthy diet , cut down on epstein intake to only 1-2x/week   A1c <7%  Eat breakfast consistently  Consume 64 oz waterat least 48 oz    Nutrition Recommendations:  1) Discuss sleep issues with MD      Educational Handouts JBL: ADA Placemat and Breakfast Ideas

## 2024-07-01 ENCOUNTER — OFFICE VISIT (OUTPATIENT)
Dept: PRIMARY CARE | Facility: CLINIC | Age: 84
End: 2024-07-01
Payer: MEDICARE

## 2024-07-01 ENCOUNTER — NUTRITION (OUTPATIENT)
Dept: NUTRITION | Facility: CLINIC | Age: 84
End: 2024-07-01
Payer: MEDICARE

## 2024-07-01 ENCOUNTER — HOSPITAL ENCOUNTER (OUTPATIENT)
Dept: RADIOLOGY | Facility: CLINIC | Age: 84
Discharge: HOME | End: 2024-07-01
Payer: MEDICARE

## 2024-07-01 VITALS
WEIGHT: 141 LBS | SYSTOLIC BLOOD PRESSURE: 148 MMHG | BODY MASS INDEX: 23.46 KG/M2 | HEART RATE: 66 BPM | DIASTOLIC BLOOD PRESSURE: 80 MMHG | OXYGEN SATURATION: 97 %

## 2024-07-01 VITALS — BODY MASS INDEX: 25.08 KG/M2 | WEIGHT: 150.5 LBS | HEIGHT: 65 IN

## 2024-07-01 DIAGNOSIS — I73.9 PVD (PERIPHERAL VASCULAR DISEASE) (CMS-HCC): ICD-10-CM

## 2024-07-01 DIAGNOSIS — M54.50 RIGHT LOW BACK PAIN, UNSPECIFIED CHRONICITY, UNSPECIFIED WHETHER SCIATICA PRESENT: ICD-10-CM

## 2024-07-01 DIAGNOSIS — M54.50 RIGHT LOW BACK PAIN, UNSPECIFIED CHRONICITY, UNSPECIFIED WHETHER SCIATICA PRESENT: Primary | ICD-10-CM

## 2024-07-01 DIAGNOSIS — E11.51 TYPE 2 DIABETES MELLITUS WITH DIABETIC PERIPHERAL ANGIOPATHY WITHOUT GANGRENE, WITHOUT LONG-TERM CURRENT USE OF INSULIN (MULTI): Primary | ICD-10-CM

## 2024-07-01 DIAGNOSIS — E11.9 TYPE 2 DIABETES MELLITUS WITHOUT COMPLICATION, WITHOUT LONG-TERM CURRENT USE OF INSULIN (MULTI): ICD-10-CM

## 2024-07-01 PROCEDURE — 3079F DIAST BP 80-89 MM HG: CPT | Performed by: INTERNAL MEDICINE

## 2024-07-01 PROCEDURE — 3077F SYST BP >= 140 MM HG: CPT | Performed by: INTERNAL MEDICINE

## 2024-07-01 PROCEDURE — 97802 MEDICAL NUTRITION INDIV IN: CPT | Performed by: INTERNAL MEDICINE

## 2024-07-01 PROCEDURE — 1160F RVW MEDS BY RX/DR IN RCRD: CPT | Performed by: INTERNAL MEDICINE

## 2024-07-01 PROCEDURE — 72100 X-RAY EXAM L-S SPINE 2/3 VWS: CPT | Performed by: RADIOLOGY

## 2024-07-01 PROCEDURE — 99213 OFFICE O/P EST LOW 20 MIN: CPT | Performed by: INTERNAL MEDICINE

## 2024-07-01 PROCEDURE — 72100 X-RAY EXAM L-S SPINE 2/3 VWS: CPT

## 2024-07-01 PROCEDURE — 1159F MED LIST DOCD IN RCRD: CPT | Performed by: INTERNAL MEDICINE

## 2024-07-01 ASSESSMENT — PATIENT HEALTH QUESTIONNAIRE - PHQ9
SUM OF ALL RESPONSES TO PHQ9 QUESTIONS 1 AND 2: 0
1. LITTLE INTEREST OR PLEASURE IN DOING THINGS: NOT AT ALL
2. FEELING DOWN, DEPRESSED OR HOPELESS: NOT AT ALL

## 2024-07-01 ASSESSMENT — ENCOUNTER SYMPTOMS
LOSS OF SENSATION IN FEET: 0
DEPRESSION: 0
OCCASIONAL FEELINGS OF UNSTEADINESS: 0
CONSTITUTIONAL NEGATIVE: 1

## 2024-07-01 NOTE — PROGRESS NOTES
Patient ID: Dora Palmer is a 83 y.o. female who presents for Hip Pain (Pain in left hip pain).    /80 (BP Location: Left arm, Patient Position: Sitting, BP Cuff Size: Adult)   Pulse 66   Wt 64 kg (141 lb)   SpO2 97%   BMI 23.46 kg/m²     HPI      I AM HAVING PAIN RT LOWER BACK   HURTS TO MOVE , PAIN SCALE 5-6/10  IT FEELS TIGHT , PRESSURE , NO RADIATION   TO EITHER LEGS , NO EXTREMITY WEAKNESS       NO INJURY OR TRAUMA       ALSO CONCERNED ABOUT RT   FOOT AND LEG , HURTS TO WALK         Subjective     Review of Systems   Constitutional: Negative.    All other systems reviewed and are negative.      Objective     Physical Exam  Vitals and nursing note reviewed.   Neck:      Vascular: No carotid bruit.   Cardiovascular:      Rate and Rhythm: Normal rate and regular rhythm.      Pulses:           Dorsalis pedis pulses are 1+ on the right side.        Posterior tibial pulses are 1+ on the right side.      Heart sounds: Normal heart sounds. No murmur heard.  Pulmonary:      Effort: Pulmonary effort is normal.      Breath sounds: Normal breath sounds.   Musculoskeletal:      Right lower leg: No edema.      Left lower leg: No edema.      Comments: SLIGHT TENDERNESS   OVER THE RT PARALUMBAR AREA   LUMBAR FLEXION NORMAL   EXTENSION PAINFUL   NO L.S TENDERNESS   SLR RT+LT NORMAL       BOTH HIPS ARE NORMAL    Neurological:      General: No focal deficit present.      Mental Status: She is oriented to person, place, and time. Mental status is at baseline.   Psychiatric:         Mood and Affect: Mood normal.         Behavior: Behavior normal.         Thought Content: Thought content normal.         Judgment: Judgment normal.         Lab Results   Component Value Date    WBC 3.4 (L) 02/28/2023    HGB 14.4 02/28/2023    HCT 43.8 02/28/2023    MCV 96 02/28/2023     02/28/2023           Problem List Items Addressed This Visit       Low back pain - Primary    Relevant Orders    XR lumbar spine 2-3 views     Referral to Physical Therapy     Other Visit Diagnoses       PVD (peripheral vascular disease) (CMS-HCC)        Relevant Orders    Vascular US PVR without exercise                 A/P         X-RAY L.S   REFFERAL PHYSICAL THERAPY   PVR STUDIES WITHOUT EXERCISE   CONTINUE TYLENOL 325MG 1 PILL THREE TIMES A DAY NEEDED FOR PAIN   FOLLOW UP IF NEEDED

## 2024-07-08 ENCOUNTER — HOSPITAL ENCOUNTER (OUTPATIENT)
Dept: VASCULAR MEDICINE | Facility: CLINIC | Age: 84
Discharge: HOME | End: 2024-07-08
Payer: MEDICARE

## 2024-07-08 DIAGNOSIS — I73.9 PVD (PERIPHERAL VASCULAR DISEASE) (CMS-HCC): ICD-10-CM

## 2024-07-08 PROCEDURE — 93923 UPR/LXTR ART STDY 3+ LVLS: CPT

## 2024-07-08 PROCEDURE — 93923 UPR/LXTR ART STDY 3+ LVLS: CPT | Performed by: SURGERY

## 2024-07-17 DIAGNOSIS — I73.9 PVD (PERIPHERAL VASCULAR DISEASE) (CMS-HCC): Primary | ICD-10-CM

## 2024-07-23 ENCOUNTER — OFFICE VISIT (OUTPATIENT)
Dept: OBSTETRICS AND GYNECOLOGY | Facility: CLINIC | Age: 84
End: 2024-07-23
Payer: MEDICARE

## 2024-07-23 VITALS
DIASTOLIC BLOOD PRESSURE: 63 MMHG | HEART RATE: 71 BPM | BODY MASS INDEX: 25.49 KG/M2 | HEIGHT: 65 IN | SYSTOLIC BLOOD PRESSURE: 136 MMHG | WEIGHT: 153 LBS

## 2024-07-23 DIAGNOSIS — Z46.89 PESSARY MAINTENANCE: ICD-10-CM

## 2024-07-23 DIAGNOSIS — N81.4 UTEROVAGINAL PROLAPSE: Primary | ICD-10-CM

## 2024-07-23 PROCEDURE — 1126F AMNT PAIN NOTED NONE PRSNT: CPT | Performed by: NURSE PRACTITIONER

## 2024-07-23 PROCEDURE — 99213 OFFICE O/P EST LOW 20 MIN: CPT | Performed by: NURSE PRACTITIONER

## 2024-07-23 PROCEDURE — 3075F SYST BP GE 130 - 139MM HG: CPT | Performed by: NURSE PRACTITIONER

## 2024-07-23 PROCEDURE — 1159F MED LIST DOCD IN RCRD: CPT | Performed by: NURSE PRACTITIONER

## 2024-07-23 PROCEDURE — 3078F DIAST BP <80 MM HG: CPT | Performed by: NURSE PRACTITIONER

## 2024-07-23 ASSESSMENT — ENCOUNTER SYMPTOMS
ENDOCRINE NEGATIVE: 1
PSYCHIATRIC NEGATIVE: 1
ABDOMINAL PAIN: 1
CONSTITUTIONAL NEGATIVE: 1
NEUROLOGICAL NEGATIVE: 1
CARDIOVASCULAR NEGATIVE: 1
EYES NEGATIVE: 1
RESPIRATORY NEGATIVE: 1
MUSCULOSKELETAL NEGATIVE: 1

## 2024-07-23 ASSESSMENT — PAIN SCALES - GENERAL: PAINLEVEL: 0-NO PAIN

## 2024-07-23 NOTE — PROGRESS NOTES
"Urogynecology Pessary Check Visit  Lazara Andre, APRN-CNP  [phone]      History of Present Illness:    HPI    Ms. Dora Palmer  presents for pessary check. She reports that the pessary is doing well, but she continues to experience a slight yellowish discharge. She denies any bloody discharge.     She also mentions a recent visit to a podiatrist for a corn on her toe, which was removed, but she continues to experience pain. The podiatrist suggested she see a vascular doctor due to concerns about circulation. She has also been experiencing pain in her hip, which was initially thought to be arthritis by an urgent care provider. She hs been referred to a vascular surgeon for further evaluation.    Additionally, she reports intermittent left-sided abdominal pain, which she associates with hard bowel movements.    4/10/2024   Pessary type: #3 ring with support   Bleeding?: Denies   Discomfort?: Denies   Bowel or bladder symptoms: Denies   Vaginal estrogen: Not using     Past medical, surgical, social, family, allergy, and medication histories were reviewed and updated in EPIC charting.       Review of Systems   Constitutional: Negative.    HENT: Negative.     Eyes: Negative.    Respiratory: Negative.     Cardiovascular: Negative.    Gastrointestinal:  Positive for abdominal pain (intermittent, left-sided, associated with hard bowel movements).   Endocrine: Negative.    Genitourinary:  Positive for vaginal discharge (continues to have slight yellowish discharge with pessary use).   Musculoskeletal: Negative.    Neurological: Negative.    Psychiatric/Behavioral: Negative.           Objective    /63   Pulse 71   Ht 1.651 m (5' 5\")   Wt 69.4 kg (153 lb)   BMI 25.46 kg/m²    Body mass index is 25.46 kg/m².     Physical Exam:  Physical Exam  Genitourinary:      Genitourinary Comments: Pessary in place, no sores or bleeding, slight yellowish discharge noted      Vaginal discharge (slight yellowish) " present.             Pelvic exam:   Speculum examination: The vagina and cervix were inspected and were free of erosions. No blood in vaginal vault. Normal discharge appreciated.    Bimanual exam: The uterus was unremarkable upon palpation.  There were no masses or tenderness in the pelvis/adnexal region.   The pessary was removed, cleaned and replaced.    Diagnoses:  #1 Uterine prolapse  #2 Left-sided abdominal pain     Plan:    Uterine prolapse, pessary management  - #3 ring with support pessary was removed. Speculum exam did not reveal any areas of active bleeding, erosions, or granulation tissue. Pessary was cleaned and replaced back.  - Upon exam, slight yellowish discharge noted, which is normal with pessary use.    2. Left-sided abdominal pain  - Likely GI in origin, possibly related to constipation  - Recommended increasing fluid intake, dietary fiber, and using heat or hot tea for relief.  - Monitor bowel movements and report any changes.    Pessary management  -Satisfactory management with pessary with no erosions. Continue current care.    - She is not using estrogen cream    - Return to clinic in 3 months      Scribe Attestation  By signing my name below, IAj Scribe, attest that this documentation has been prepared under the direction and in the presence of JACQUE Butts on 07/23/2024 at 2:00 PM.    I, JACQUE Butts, personally performed the services described in this documentation which was scribed virtually and I confirm that it is both accurate and complete.     JACQUE Butts

## 2024-08-01 ENCOUNTER — OFFICE VISIT (OUTPATIENT)
Dept: PULMONOLOGY | Facility: CLINIC | Age: 84
End: 2024-08-01
Payer: MEDICARE

## 2024-08-01 VITALS
SYSTOLIC BLOOD PRESSURE: 157 MMHG | TEMPERATURE: 97.5 F | OXYGEN SATURATION: 97 % | HEART RATE: 72 BPM | DIASTOLIC BLOOD PRESSURE: 71 MMHG

## 2024-08-01 DIAGNOSIS — J45.20 MILD INTERMITTENT ASTHMATIC BRONCHITIS WITHOUT COMPLICATION (HHS-HCC): ICD-10-CM

## 2024-08-01 DIAGNOSIS — J44.9 CHRONIC OBSTRUCTIVE PULMONARY DISEASE, UNSPECIFIED COPD TYPE (MULTI): ICD-10-CM

## 2024-08-01 DIAGNOSIS — R06.83 SNORING: Primary | ICD-10-CM

## 2024-08-01 DIAGNOSIS — R05.9 COUGH, UNSPECIFIED TYPE: ICD-10-CM

## 2024-08-01 DIAGNOSIS — R06.2 WHEEZING: ICD-10-CM

## 2024-08-01 PROCEDURE — 99203 OFFICE O/P NEW LOW 30 MIN: CPT | Performed by: STUDENT IN AN ORGANIZED HEALTH CARE EDUCATION/TRAINING PROGRAM

## 2024-08-01 PROCEDURE — 3077F SYST BP >= 140 MM HG: CPT | Performed by: STUDENT IN AN ORGANIZED HEALTH CARE EDUCATION/TRAINING PROGRAM

## 2024-08-01 PROCEDURE — 3078F DIAST BP <80 MM HG: CPT | Performed by: STUDENT IN AN ORGANIZED HEALTH CARE EDUCATION/TRAINING PROGRAM

## 2024-08-01 PROCEDURE — 99213 OFFICE O/P EST LOW 20 MIN: CPT | Performed by: STUDENT IN AN ORGANIZED HEALTH CARE EDUCATION/TRAINING PROGRAM

## 2024-08-01 PROCEDURE — 1126F AMNT PAIN NOTED NONE PRSNT: CPT | Performed by: STUDENT IN AN ORGANIZED HEALTH CARE EDUCATION/TRAINING PROGRAM

## 2024-08-01 RX ORDER — ALBUTEROL SULFATE 90 UG/1
2 AEROSOL, METERED RESPIRATORY (INHALATION) EVERY 4 HOURS PRN
Qty: 8.5 G | Refills: 11 | Status: SHIPPED | OUTPATIENT
Start: 2024-08-01 | End: 2025-08-01

## 2024-08-01 ASSESSMENT — ENCOUNTER SYMPTOMS
DEPRESSION: 0
ABDOMINAL PAIN: 0
ABDOMINAL DISTENTION: 0
CHILLS: 0
FEVER: 0
ARTHRALGIAS: 0
PALPITATIONS: 0
UNEXPECTED WEIGHT CHANGE: 0

## 2024-08-01 ASSESSMENT — PAIN SCALES - GENERAL: PAINLEVEL: 0-NO PAIN

## 2024-08-01 NOTE — PATIENT INSTRUCTIONS
Thank you for visiting the Pulmonary Clinic today.   Your breathing medications: continue spiriva, use the albuterol at night before you go to bed   Tests: pulmonary function test (call ), home sleep study   Return in 3 months   If you have questions or concerns, call [phone] (option [phone])

## 2024-08-01 NOTE — PROGRESS NOTES
Department of Medicine I Division of Pulmonary, Critical Care, and Sleep Medicine   [id number] [place] [address]  Phone: [phone]  Fax: [phone]    History of Present Illness   Dora Palmer is a 83 y.o. female presenting with wheezing    Referred by:  Jarod Alexander MD, for wheezing. I have independently interviewed and examined the patient in the office and reviewed available records.     In the last year has noticed increased wheezing when she lays down at night as opposed to the day time   Triggers for dyspnea:  walking up steps, biking, cleaning  Denies recurrent bronchitis   Also notes nasal congestion at night --uses flonase   Denies any seasonal allergies   Occasional cough--dry   Does snore at night   Has receive POLK for her thyroid in the past     CAT: 18   Pulmonary medications: spiriva (as needed),  albuterol as needed   Review of Systems  Review of Systems   Constitutional:  Negative for chills, fever and unexpected weight change.   Respiratory:          As per HPI    Cardiovascular:  Negative for chest pain, palpitations and leg swelling.   Gastrointestinal:  Negative for abdominal distention and abdominal pain.   Musculoskeletal:  Negative for arthralgias and gait problem.   Skin:  Negative for rash.     All other review of systems are negative and/or non-contributory.    Past Medical History   She has a past medical history of Glaucoma, Personal history of other diseases of the circulatory system, Personal history of other diseases of the nervous system and sense organs, Personal history of other endocrine, nutritional and metabolic disease, Pseudophakia, both eyes, Radiculopathy, lumbar region (09/13/2016), Supraventricular tachycardia (CMS-[facility]), and Unsteadiness on feet (07/08/2020).    Immunizations     Immunization History   Administered Date(s) Administered    Influenza, seasonal, injectable 03/03/2023    Moderna SARS-CoV-2 Vaccination 12/31/2021     Pfizer Purple Cap SARS-CoV-2 03/06/2021    Pneumococcal Conjugate PCV 7 03/03/2023    Pneumococcal conjugate vaccine, 13-valent (PREVNAR 13) 12/15/2014    Pneumococcal polysaccharide vaccine, 23-valent, age 2 years and older (PNEUMOVAX 23) 11/10/2009    Tdap vaccine, age 7 year and older (BOOSTRIX, ADACEL) 11/10/2009, 12/14/2023       Medications and Allergies     Current Outpatient Medications   Medication Instructions    albuterol (ProAir HFA) 90 mcg/actuation inhaler 2 puffs, inhalation, Every 4 hours PRN    amLODIPine (NORVASC) 10 mg, oral, Daily, -----DUE FOR APPT    aspirin 81 mg chewable tablet 1 tablet, oral, Daily    atorvastatin (LIPITOR) 40 mg, oral, Daily    carvedilol (COREG) 25 mg, oral, 2 times daily (morning and late afternoon)    cholecalciferol (Vitamin D-3) 50 MCG (2000 UT) tablet 1 tablet, oral, Daily    dorzolamide (Trusopt) 2 % ophthalmic solution 1 drop, Both Eyes, 2 times daily    esomeprazole (NexIUM) 40 mg DR capsule 1 capsule, oral, Daily    furosemide (LASIX) 40 mg, oral, Daily    latanoprost (Xalatan) 0.005 % ophthalmic solution 1 drop, Both Eyes, Nightly    losartan (COZAAR) 100 mg, oral, Daily, ------DUE FOR APPT    multivitamin/iron/folic acid (CENTRUM COMPLETE ORAL) 1 tablet, oral, Daily    prazosin (MINIPRESS) 1 mg, oral, 2 times daily    tiotropium (SPIRIVA WITH HANDIHALER) 18 mcg, inhalation, Daily RT      Patient has no known allergies.    Social History   She reports that she has been smoking cigarettes. She has never used smokeless tobacco. She reports current alcohol use. She reports that she does not use drugs.    Smoking History: still smoking,  1-2 cigarettes a day.  Smoking for about 60 years.   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     Family History     Family History   Problem Relation Name Age of Onset    Diabetes Mother      Hypertension Mother      Diabetes Son              Surgical History   She has a past surgical history that includes Other surgical history (05/29/2013); Colonoscopy (05/29/2013); Tonsillectomy (06/03/2014); and Cataract extraction (Bilateral).    Physical Exam     Vitals:    08/01/24 1245   BP: 157/71   Pulse: 72   Temp: 36.4 °C (97.5 °F)   SpO2: 97%       Physical Exam  Vitals reviewed.   Constitutional:       General: She is awake.   Cardiovascular:      Rate and Rhythm: Normal rate and regular rhythm.   Pulmonary:      Effort: Pulmonary effort is normal.      Breath sounds: Normal breath sounds.   Abdominal:      General: Bowel sounds are normal.      Palpations: Abdomen is soft.      Tenderness: There is no abdominal tenderness.   Neurological:      Mental Status: She is alert and oriented to person, place, and time.   Psychiatric:         Attention and Perception: Attention and perception normal.         Behavior: Behavior normal.          Results   Pulmonary Function Tests:  None on file     Chest Radiograph:  XR chest 2 views     Narrative  Interpreted By:  RAY DAMON MD  MRN: [id number]  Patient Name: ELVIS WHITE    STUDY:   CHEST 2 VIEW PA AND LAT;  3/31/2023 11:29 am    INDICATION:  COUGH , WHEEZING.    COMPARISON:  02/28/2023    ACCESSION NUMBER(S):  [id number]    ORDERING CLINICIAN:  MARAH TELLEZ    FINDINGS:  There is no focal lung consolidation or effusion. There is no edema.  The cardiac silhouette is within normal limits for size.    Impression  No acute cardiopulmonary process.      Chest CT Scan:  No results found for this or any previous visit from the past 365 days.       ECHO:  No results found for this or any previous visit from the past 365 days.       Labs:  Lab Results   Component Value Date    WBC 3.4 (L) 02/28/2023    HGB 14.4 02/28/2023    HCT 43.8 02/28/2023    MCV 96 02/28/2023     02/28/2023       Lab Results   Component Value Date    CREATININE 1.02 04/16/2024    BUN 15 04/16/2024     04/16/2024     "K 4.0 04/16/2024     04/16/2024    CO2 24 04/16/2024        Lab Results   Component Value Date    SEDRATE 16 07/20/2021        IgE: No results found for: \"IGE\"   Respiratory Allergy Panel:  AEC:   Eosinophils Absolute (x10E9/L)   Date Value   10/12/2021 0.20   06/05/2021 0.00     Eosinophils % (%)   Date Value   10/12/2021 4.5   06/05/2021 0.0       Cultures:  No results found for: \"AFBCX\"   No results found for: \"RESPCULTCYFI\"    No results found for the last 90 days.  C     Assessment and Plan       Wheezing at night   Snoring--high risk MUNIRA       Recommendations:   -PFT   -HSAT   -take spiriva consistently,  can trial albuterol at night for a couple of weeks to see if this helps with his symptoms         Risa Quispe MD  08/01/2024    "

## 2024-08-12 NOTE — PROGRESS NOTES
Physical Therapy    Physical Therapy Evaluation and Treatment      Patient Name: Dora Palmer  MRN: [id number]  Today's Date: 8/13/24  Visit 1    Time Entry:   Time Calculation  Start Time: 1305  Stop Time: 1350  Time Calculation (min): 45 min  PT Evaluation Time Entry  PT Evaluation (Moderate) Time Entry: 35  PT Therapeutic Procedures Time Entry  Manual Therapy Time Entry: 15                   Assessment:   Patient presents with clinical signs and symptoms consistent with lumbar spinal spondylosis with possible LE radicular symptoms in the L 5/S1 dermatomal distribution vs vascular claudication.  These impairments affect ADLs, work, recreational, exercise, transfer, ambulation, lift/carry, and sleep function that requires skilled PT intervention to resolve and enable patient to return to previous level of function. Factors that may affect progress in PT are chronic pain,  medical co-morbidities,  work task strain, and patient compliance. Patient is a  neutral stabilization program candidate Primary clinical problem is lumbopelvic muscle hypertonus.  Initial treatment of Strain counter strain technique to reduce muscle hypertonus and education was understood by Dora Palmer       Plan:  OP PT Plan  Treatment/Interventions: Dry needling, Education/ Instruction, Hot pack, Manual therapy, Mechanical traction, Neuromuscular re-education, Self care/ home management, Taping techniques, Therapeutic activities, Therapeutic exercises  PT Plan: Skilled PT  PT Frequency: 1 time per week  Duration: 12  Onset Date: 01/01/23  Certification Period Start Date: 08/13/24  Certification Period End Date: 11/11/24  Rehab Potential: Good  Plan of Care Agreement: Patient        Problem List Items Addressed This Visit             ICD-10-CM    Low back pain - Primary M54.50    Relevant Orders    Follow Up In Physical Therapy    Follow Up In Physical Therapy    Osteoarthritis of hip M16.9    Relevant Orders    Follow Up In Physical  Therapy      Subjective    Dora Alvarador c/o R lower leg discomfort with long walking distance denies distal numbness and tingling. Stopping helps alleviate leg pain.1 month ago she had a L posterior hip pain. R lower leg pain intermittent claudication vs neurogenic claudication     Pain:   5/10 walking 0/10 rest  Home Living:   Lives in 2 story house with grand son  Prior Level of Function:  Prior Function Per Pt/Caregiver Report  Hand Dominance: Rightindependent ADLs  Observation:   Good alignment symmetrical  Hip joint clearance: PROM  Flexion  R 110 L 110 ,  IR   R30  L  30 ,   ER  R 50  L 50 ,  Extension  R    L    Single leg stance:  Eyes open  R 5 sec   L 5 sec    Lumbar AROM in standing:    Flex  100 %  fingers to toes  , Extension  80  %  central P ,     Myotomal Strength:  L3   R 5/5   L 5/5,  L4  R  5/5  L  5/5, L5 R  4/5  L  5/5,  S1   R  5/5  L  5/5    Hip Strength:  Abduction   R 4 /5  L  4/5,     Flexion R   5/5   L  5/5        Sensation:WNL  Accessory joint mobility: PA glide WNL    Lumbopelvic mobility: Side glide  R   L      cleared    SI joint: WNL    Palpation:  + trigger points L QL, L piriformis, R glute med and max, R lateral gastroc    Gait: WNL  Special Tests:   SLR R 70 deg  L 60 deg   Objective   Cognition:   A+Ox3      Outcome Measures:  LEFS 58/80    Treatments:  Manual Therapy:  Strain counter strain technique to reduce muscle hypertonus to L QL L piriformis, R glut med and max    EDUCATION:     extensive education regarding mechanism of injury, relevant functional anatomy, treatment program rational, self management, HEP, and POC     Goals:  1. Increase Dora Palmer abiliuty to walk community distances without R loiwer calf smptom  2. Eliminate muscular Tps.  3. Increase single leg stance stability to 10 sec  4. independent hep   5. Improve outcome score by 4 points

## 2024-08-13 ENCOUNTER — EVALUATION (OUTPATIENT)
Dept: PHYSICAL THERAPY | Facility: CLINIC | Age: 84
End: 2024-08-13
Payer: MEDICARE

## 2024-08-13 DIAGNOSIS — M16.9 OSTEOARTHRITIS OF HIP: ICD-10-CM

## 2024-08-13 DIAGNOSIS — M54.50 RIGHT LOW BACK PAIN, UNSPECIFIED CHRONICITY, UNSPECIFIED WHETHER SCIATICA PRESENT: Primary | ICD-10-CM

## 2024-08-13 DIAGNOSIS — M54.50 LOW BACK PAIN: ICD-10-CM

## 2024-08-13 PROCEDURE — 97140 MANUAL THERAPY 1/> REGIONS: CPT | Mod: GP | Performed by: PHYSICAL THERAPIST

## 2024-08-13 PROCEDURE — 97162 PT EVAL MOD COMPLEX 30 MIN: CPT | Mod: GP | Performed by: PHYSICAL THERAPIST

## 2024-08-15 ENCOUNTER — HOSPITAL ENCOUNTER (OUTPATIENT)
Dept: RESPIRATORY THERAPY | Facility: HOSPITAL | Age: 84
Discharge: HOME | End: 2024-08-15
Payer: MEDICARE

## 2024-08-15 DIAGNOSIS — R06.2 WHEEZING: ICD-10-CM

## 2024-08-15 PROCEDURE — 94726 PLETHYSMOGRAPHY LUNG VOLUMES: CPT

## 2024-08-15 PROCEDURE — 94726 PLETHYSMOGRAPHY LUNG VOLUMES: CPT | Performed by: INTERNAL MEDICINE

## 2024-08-15 PROCEDURE — 94729 DIFFUSING CAPACITY: CPT | Performed by: INTERNAL MEDICINE

## 2024-08-15 PROCEDURE — 94060 EVALUATION OF WHEEZING: CPT | Performed by: INTERNAL MEDICINE

## 2024-08-18 DIAGNOSIS — I10 PRIMARY HYPERTENSION: ICD-10-CM

## 2024-08-18 DIAGNOSIS — I10 HYPERTENSION, UNSPECIFIED TYPE: ICD-10-CM

## 2024-08-19 RX ORDER — AMLODIPINE BESYLATE 10 MG/1
10 TABLET ORAL DAILY
Qty: 100 TABLET | Refills: 0 | Status: SHIPPED | OUTPATIENT
Start: 2024-08-19

## 2024-08-19 RX ORDER — LOSARTAN POTASSIUM 100 MG/1
100 TABLET ORAL DAILY
Qty: 100 TABLET | Refills: 0 | Status: SHIPPED | OUTPATIENT
Start: 2024-08-19

## 2024-08-22 ENCOUNTER — APPOINTMENT (OUTPATIENT)
Dept: OPHTHALMOLOGY | Facility: CLINIC | Age: 84
End: 2024-08-22
Payer: MEDICARE

## 2024-08-22 DIAGNOSIS — Z96.1 PSEUDOPHAKIA OF BOTH EYES: ICD-10-CM

## 2024-08-22 DIAGNOSIS — H40.1131 PRIMARY OPEN-ANGLE GLAUCOMA, BILATERAL, MILD STAGE: Primary | ICD-10-CM

## 2024-08-22 PROCEDURE — 99213 OFFICE O/P EST LOW 20 MIN: CPT | Performed by: OPHTHALMOLOGY

## 2024-08-22 ASSESSMENT — ENCOUNTER SYMPTOMS
EYES NEGATIVE: 0
PSYCHIATRIC NEGATIVE: 0
HEMATOLOGIC/LYMPHATIC NEGATIVE: 0
ALLERGIC/IMMUNOLOGIC NEGATIVE: 0
RESPIRATORY NEGATIVE: 0
ENDOCRINE NEGATIVE: 0
CARDIOVASCULAR NEGATIVE: 0
NEUROLOGICAL NEGATIVE: 0
CONSTITUTIONAL NEGATIVE: 0
GASTROINTESTINAL NEGATIVE: 0
MUSCULOSKELETAL NEGATIVE: 0

## 2024-08-22 ASSESSMENT — CONF VISUAL FIELD
OS_SUPERIOR_NASAL_RESTRICTION: 0
OD_INFERIOR_TEMPORAL_RESTRICTION: 0
OS_INFERIOR_NASAL_RESTRICTION: 0
OD_SUPERIOR_TEMPORAL_RESTRICTION: 0
OS_INFERIOR_TEMPORAL_RESTRICTION: 0
OD_NORMAL: 1
OS_NORMAL: 1
OD_SUPERIOR_NASAL_RESTRICTION: 0
OS_SUPERIOR_TEMPORAL_RESTRICTION: 0
OD_INFERIOR_NASAL_RESTRICTION: 0

## 2024-08-22 ASSESSMENT — TONOMETRY
OS_IOP_MMHG: 12
IOP_METHOD: GOLDMANN APPLANATION
OD_IOP_MMHG: 12

## 2024-08-22 ASSESSMENT — PACHYMETRY
OD_CT(UM): 666
OS_CT(UM): 662

## 2024-08-22 ASSESSMENT — VISUAL ACUITY
OS_CC: 20/25
OD_CC: 20/25
METHOD: SNELLEN - LINEAR
CORRECTION_TYPE: GLASSES

## 2024-08-22 ASSESSMENT — SLIT LAMP EXAM - LIDS: COMMENTS: NORMAL

## 2024-08-22 ASSESSMENT — REFRACTION_WEARINGRX
SPECS_TYPE: BIFOCAL
OS_ADD: +2.50
OD_CYLINDER: -0.50
OD_ADD: +2.50
OS_SPHERE: +0.25
OD_AXIS: 075
OD_SPHERE: -0.75
OS_AXIS: 090
OS_CYLINDER: -0.50

## 2024-08-22 ASSESSMENT — EXTERNAL EXAM - LEFT EYE: OS_EXAM: NORMAL

## 2024-08-22 ASSESSMENT — CUP TO DISC RATIO
OS_RATIO: 0.75
OD_RATIO: 0.6

## 2024-08-22 NOTE — PROGRESS NOTES
Visual Acuity (Snellen - Linear)         Right Left    Dist cc 20/25 20/25      Correction: Glasses          Tonometry       Tonometry (Goldmann Applanation, 8:29 AM)         Right Left    Pressure 12 12                  Assessment/Plan   Last dilated:  1/4/24  specular microscopy OD:  2,698    1.  Primary Open-Angle Glaucoma OU:  /662 Tm 18/19.  IOPs should be in the low teens given progression at upper teens.   s/p combined with KDB OD 2/14/23:  pre-op VA 20/60, IOP 18 mmHg.  Toric @ approx 95 degrees and target 97.  s/p combined with KDB OS 5/10/23:  pre-op VA 20/50, IOP 15 mmHg.  HVF OD hints at progression, but RNFL OD is stable.  IOPs are outside of range.  Will advance Rx.  Timolol -> no effect.  Brimonidine -> 2 mmHg OD, but nothing OS.  Goal for OD is low teens.  IOP now well controlled.     Plan:  cont latanoprost OU QHS               cont dorzolamide 0.2% OU BID               f/u 4 months (HVF, dilation, RNFL)    2.  Pseudophakia (PCIOL - toric) OU:  no visually significant PCO     Plan:  monitor    3.  RLL Nevus:  pt would like removal     Plan:  as per Dr. Gomez

## 2024-09-13 ENCOUNTER — TELEPHONE (OUTPATIENT)
Dept: SLEEP MEDICINE | Facility: HOSPITAL | Age: 84
End: 2024-09-13
Payer: MEDICARE

## 2024-09-13 NOTE — TELEPHONE ENCOUNTER
Dear Dr. Quispe:     Thank you for referring your patient to a  Sleep Testing center for their home sleep apnea test (HSAT).     Your patient recently had an inconclusive HSAT due to short recording (< 4 hrs).     American Academy of Sleep Medicine (AASM) Guidelines typically recommend an in-center, overnight sleep test after an inconclusive attempt at an HSAT. However, we can attempt an HSAT one more time if there are special circumstances.     Given that, would you like us to re-attempt the HSAT or would you like to order an in-lab sleep study?     If you would like an in-lab sleep study, please place the order.     If you would like a repeat HSAT, no new order is needed.     Please let us know how you would like us to proceed.     One of our caregivers will reach out to schedule your patient's in-lab sleep study once the order is placed.     Kind regards,   The  Sleep Medicine Team

## 2024-09-16 ENCOUNTER — OFFICE VISIT (OUTPATIENT)
Dept: CARDIOLOGY | Facility: HOSPITAL | Age: 84
End: 2024-09-16
Payer: MEDICARE

## 2024-09-16 VITALS
OXYGEN SATURATION: 95 % | SYSTOLIC BLOOD PRESSURE: 117 MMHG | BODY MASS INDEX: 25.97 KG/M2 | HEIGHT: 65 IN | WEIGHT: 155.9 LBS | HEART RATE: 112 BPM | DIASTOLIC BLOOD PRESSURE: 73 MMHG

## 2024-09-16 DIAGNOSIS — Z72.0 TOBACCO ABUSE: ICD-10-CM

## 2024-09-16 DIAGNOSIS — I50.20 HFREF (HEART FAILURE WITH REDUCED EJECTION FRACTION): Chronic | ICD-10-CM

## 2024-09-16 DIAGNOSIS — I47.19 ATRIAL TACHYCARDIA (CMS-HCC): Chronic | ICD-10-CM

## 2024-09-16 DIAGNOSIS — I48.0 PAROXYSMAL ATRIAL FIBRILLATION (MULTI): Chronic | ICD-10-CM

## 2024-09-16 DIAGNOSIS — I10 PRIMARY HYPERTENSION: Primary | Chronic | ICD-10-CM

## 2024-09-16 LAB
ATRIAL RATE: 112 BPM
P AXIS: 267 DEGREES
P OFFSET: 191 MS
P ONSET: 134 MS
PR INTERVAL: 172 MS
Q ONSET: 220 MS
QRS COUNT: 19 BEATS
QRS DURATION: 80 MS
QT INTERVAL: 358 MS
QTC CALCULATION(BAZETT): 488 MS
QTC FREDERICIA: 440 MS
R AXIS: 58 DEGREES
T AXIS: 70 DEGREES
T OFFSET: 399 MS
VENTRICULAR RATE: 112 BPM

## 2024-09-16 PROCEDURE — 1159F MED LIST DOCD IN RCRD: CPT | Performed by: INTERNAL MEDICINE

## 2024-09-16 PROCEDURE — G2211 COMPLEX E/M VISIT ADD ON: HCPCS | Performed by: INTERNAL MEDICINE

## 2024-09-16 PROCEDURE — 99214 OFFICE O/P EST MOD 30 MIN: CPT | Performed by: INTERNAL MEDICINE

## 2024-09-16 PROCEDURE — 3078F DIAST BP <80 MM HG: CPT | Performed by: INTERNAL MEDICINE

## 2024-09-16 PROCEDURE — 3074F SYST BP LT 130 MM HG: CPT | Performed by: INTERNAL MEDICINE

## 2024-09-16 PROCEDURE — 93005 ELECTROCARDIOGRAM TRACING: CPT | Performed by: INTERNAL MEDICINE

## 2024-09-16 NOTE — PROGRESS NOTES
Physical Therapy    Physical Therapy Evaluation and Treatment      Patient Name: Dora Palmer  MRN: [id number]  Today's Date: 9/17/24  Visit 2    Time Entry:         PT Therapeutic Procedures Time Entry  Manual Therapy Time Entry: 15  Therapeutic Exercise Time Entry: 30                   Assessment:  >pt tolerated flexion stabilization back program to muscular fatigue but not pain.      Plan:     Continue POC flexion stabilization program, treat Tps as needed to nnormalize muscle tone      Problem List Items Addressed This Visit             ICD-10-CM    Low back pain - Primary M54.50    Osteoarthritis of hip M16.9      Subjective    Dora Palmer reports R lower leg gets tight   Pain:   5/10 walking 0/10 rest      Treatments:  Manual Therapy:  Strain counter strain technique to reduce muscle hypertonus to L QL L piriformis, R glut med and max  There ex:  DKC 2x30 sec  Dead bug alternate leg 2 x10  Seated anti-rotation red tband 5 x 10 sec each direction  Sci fit stepper 6 min at 45 RPM lv 2.2  EDUCATION:     extensive education regarding mechanism of injury, relevant functional anatomy, treatment program rational, self management, HEP, and POC   Written HEP given for above exercises   Goals:  1. Increase Dora Palmer ability to walk community distances without R lower calf smptom  2. Eliminate muscular Tps.  3. Increase single leg stance stability to 10 sec  4. independent hep   5. Improve outcome score by 4 points

## 2024-09-16 NOTE — TELEPHONE ENCOUNTER
Provider would like patient to repeat HST due to inconclusive results.     Message sent to Christel to resend patient study.     Tati

## 2024-09-16 NOTE — PROGRESS NOTES
"Chief Complaint:   No chief complaint on file.     History Of Present Illness:    Dora Palmer is a 83 y.o. female here for followup. She was hospitalized late 5/2021 with septic shock and bacteremia in the setting E coli UTI c/b [facility] with subsequent discovery of underlying type II DM (a1c 6.5%), acute heart failure with cardiomyopathy (HFrEF; EF 45%) in setting of sepsis, and new persistent atrial fibrillation. She eventually converted to SR with no documented AF since. She was hospitalized again in October for reported acute HF. It was unclear if she was taking her medications appropriately. She had no noted atrial fibrillation during that encounter. She otherwise has a history of PAD, and COPD.      She reports doing well overall outside of some foot pain that she is seeing podiatry and possibly vascular for. She is down to 1 cigarette a day.         Last Recorded Vitals:  Vitals:    09/16/24 1420   BP: 117/73   BP Location: Left arm   Pulse: (!) 112   SpO2: 95%   Weight: 70.7 kg (155 lb 14.4 oz)   Height: 1.651 m (5' 5\")                 Allergies:  Patient has no known allergies.    Outpatient Medications:  Current Outpatient Medications   Medication Instructions    albuterol (ProAir HFA) 90 mcg/actuation inhaler 2 puffs, inhalation, Every 4 hours PRN    amLODIPine (NORVASC) 10 mg, oral, Daily    aspirin 81 mg chewable tablet 1 tablet, oral, Daily    atorvastatin (LIPITOR) 40 mg, oral, Daily    carvedilol (COREG) 25 mg, oral, 2 times daily (morning and late afternoon)    cholecalciferol (Vitamin D-3) 50 MCG (2000 UT) tablet 1 tablet, oral, Daily    dorzolamide (Trusopt) 2 % ophthalmic solution 1 drop, Both Eyes, 2 times daily    esomeprazole (NexIUM) 40 mg DR capsule 1 capsule, oral, Daily    furosemide (LASIX) 40 mg, oral, Daily    latanoprost (Xalatan) 0.005 % ophthalmic solution 1 drop, Both Eyes, Nightly    losartan (COZAAR) 100 mg, oral, Daily    multivitamin/iron/folic acid (CENTRUM COMPLETE ORAL) 1 " tablet, oral, Daily    prazosin (MINIPRESS) 1 mg, oral, 2 times daily    tiotropium (SPIRIVA WITH HANDIHALER) 18 mcg, inhalation, Daily RT         Physical Exam:  Gen Well appearing elderly female sitting up in NAD. Body mass index is 25.94 kg/m².   CV reg rate. No m/r/g appreciated. No JVD or leg edema.    Pulm Lungs clear with normal respiratory effort.  Neuro Alert and conversant. Grossly nonfocal.       I reviewed the patient's ECG - Probable atrial tachycardia with some ventricular trigeminy noted.    I reviewed most recent imaging / labs / and office notes    Assessment/Plan   1. Hypertension  BP improved and now acceptable. Her present regimen is to continue.     2. HFmrEF  History of. EF may have improved but she remains euvolemic and asymptomatic thus no plans for a recheck at this time and will con't her losartan and furosemide indefinitely.       3. Atrial fibrillation  History of. May have been entirely secondary to her sepsis. No documented recurrence while hospitalized or with by event monitoring. We elected to not continue anticoagulation after a detailed discussion (see prior notes). Monitoring for recurrences.     4. Tobacco abuse  Smoking cessation encouraged.     5. Ectopic / atrial tachycardia  By ecg. Asymptomatic and resolved on exam. Monitoring for recurrence.         Follow-up 6 months        Jarod Alexander MD

## 2024-09-17 ENCOUNTER — TREATMENT (OUTPATIENT)
Dept: PHYSICAL THERAPY | Facility: CLINIC | Age: 84
End: 2024-09-17
Payer: MEDICARE

## 2024-09-17 DIAGNOSIS — M54.50 RIGHT LOW BACK PAIN, UNSPECIFIED CHRONICITY, UNSPECIFIED WHETHER SCIATICA PRESENT: Primary | ICD-10-CM

## 2024-09-17 DIAGNOSIS — M54.50 LOW BACK PAIN: ICD-10-CM

## 2024-09-17 DIAGNOSIS — M16.9 OSTEOARTHRITIS OF HIP: ICD-10-CM

## 2024-09-17 PROCEDURE — 97110 THERAPEUTIC EXERCISES: CPT | Mod: GP | Performed by: PHYSICAL THERAPIST

## 2024-09-17 PROCEDURE — 97140 MANUAL THERAPY 1/> REGIONS: CPT | Mod: GP | Performed by: PHYSICAL THERAPIST

## 2024-09-18 ENCOUNTER — OFFICE VISIT (OUTPATIENT)
Dept: VASCULAR SURGERY | Facility: CLINIC | Age: 84
End: 2024-09-18
Payer: MEDICARE

## 2024-09-18 VITALS
HEIGHT: 65 IN | HEART RATE: 73 BPM | BODY MASS INDEX: 24.99 KG/M2 | WEIGHT: 150 LBS | SYSTOLIC BLOOD PRESSURE: 118 MMHG | DIASTOLIC BLOOD PRESSURE: 69 MMHG

## 2024-09-18 DIAGNOSIS — I73.9 PVD (PERIPHERAL VASCULAR DISEASE) (CMS-HCC): ICD-10-CM

## 2024-09-18 PROCEDURE — 3078F DIAST BP <80 MM HG: CPT | Performed by: NURSE PRACTITIONER

## 2024-09-18 PROCEDURE — 99214 OFFICE O/P EST MOD 30 MIN: CPT | Performed by: NURSE PRACTITIONER

## 2024-09-18 PROCEDURE — 3074F SYST BP LT 130 MM HG: CPT | Performed by: NURSE PRACTITIONER

## 2024-09-18 PROCEDURE — 99204 OFFICE O/P NEW MOD 45 MIN: CPT | Performed by: NURSE PRACTITIONER

## 2024-09-18 PROCEDURE — 1159F MED LIST DOCD IN RCRD: CPT | Performed by: NURSE PRACTITIONER

## 2024-09-18 PROCEDURE — 1126F AMNT PAIN NOTED NONE PRSNT: CPT | Performed by: NURSE PRACTITIONER

## 2024-09-18 ASSESSMENT — ENCOUNTER SYMPTOMS
MUSCULOSKELETAL NEGATIVE: 1
COLOR CHANGE: 0
PALPITATIONS: 0
GASTROINTESTINAL NEGATIVE: 1
WOUND: 0
RESPIRATORY NEGATIVE: 1
LIGHT-HEADEDNESS: 0
NUMBNESS: 0
PSYCHIATRIC NEGATIVE: 1
ENDOCRINE NEGATIVE: 1
FACIAL ASYMMETRY: 0
ALLERGIC/IMMUNOLOGIC NEGATIVE: 1
EYES NEGATIVE: 1
WEAKNESS: 0
CONSTITUTIONAL NEGATIVE: 1
SHORTNESS OF BREATH: 0
DIZZINESS: 0

## 2024-09-18 ASSESSMENT — PAIN SCALES - GENERAL: PAINLEVEL: 0-NO PAIN

## 2024-09-18 NOTE — PROGRESS NOTES
NPV REASON: weak right foot pulse    CURRENT ENCOUNTER:  Dora Palmer is 83 y.o. female here for follow up of weak right foot pulse.    Develops right calf pain after about 500 feet, sitting helps improve the pain after about a minute.    She does note BLE leg/foot cramps when laying down    No numbness or tingling    When the sheets touch her right foot it is painful    Denies wounds to the feet    PMH:  DMII  HFrEF  AF   Age-related nuclear cataract    Astigmatism, bilateral   Bilateral myopia, presbyopia    Chronic obstructive pulmonary disease     Hypertension    Hypothyroidism   Low back pain   Osteopenia     Palpitations / Svt    Peripheral vascular disease   Sensorineural hearing loss, bilateral   Stress incontinence     Uterus prolapse   Gait Unsteadiness     PSH   S/p Complete Colonoscopy 2006    Tonsillectomy  Wrist Surgery     SH social alcohol. Current smoker    FH: mother, maternal grandmother- CAD     PastMedHX:  Past Medical History:   Diagnosis Date    Glaucoma     Personal history of other diseases of the circulatory system     History of hypertension    Personal history of other diseases of the nervous system and sense organs     History of glaucoma    Personal history of other endocrine, nutritional and metabolic disease     History of thyroid disorder    Pseudophakia, both eyes     Radiculopathy, lumbar region 09/13/2016    Lumbar radiculopathy    Supraventricular tachycardia (CMS-HCC)     Supraventricular tachycardia    Unsteadiness on feet 07/08/2020    Unsteadiness       Meds:     Current Outpatient Medications:     albuterol (ProAir HFA) 90 mcg/actuation inhaler, Inhale 2 puffs every 4 hours if needed for wheezing or shortness of breath., Disp: 8.5 g, Rfl: 11    amLODIPine (Norvasc) 10 mg tablet, Take 1 tablet (10 mg) by mouth once daily., Disp: 100 tablet, Rfl: 0    aspirin 81 mg chewable tablet, Chew 1 tablet (81 mg) once daily., Disp: , Rfl:     atorvastatin (Lipitor) 40 mg tablet, Take  1 tablet (40 mg) by mouth once daily., Disp: 100 tablet, Rfl: 3    carvedilol (Coreg) 25 mg tablet, Take 1 tablet (25 mg) by mouth 2 times daily (morning and late afternoon)., Disp: 180 tablet, Rfl: 3    cholecalciferol (Vitamin D-3) 50 MCG (2000 UT) tablet, Take 1 tablet (2,000 Units) by mouth once daily., Disp: , Rfl:     dorzolamide (Trusopt) 2 % ophthalmic solution, Administer 1 drop into both eyes 2 times a day., Disp: 30 mL, Rfl: 3    esomeprazole (NexIUM) 40 mg DR capsule, Take 1 capsule (40 mg) by mouth once daily., Disp: , Rfl:     furosemide (Lasix) 40 mg tablet, Take 1 tablet (40 mg) by mouth once daily., Disp: 100 tablet, Rfl: 1    latanoprost (Xalatan) 0.005 % ophthalmic solution, INSTILL 1 DROP INTO BOTH EYES AT BEDTIME, Disp: 7.5 mL, Rfl: 3    losartan (Cozaar) 100 mg tablet, Take 1 tablet (100 mg) by mouth once daily., Disp: 100 tablet, Rfl: 0    multivitamin/iron/folic acid (CENTRUM COMPLETE ORAL), Take 1 tablet by mouth once daily., Disp: , Rfl:     prazosin (Minipress) 1 mg capsule, TAKE 1 CAPSULE BY MOUTH TWICE  DAILY, Disp: 200 capsule, Rfl: 2    tiotropium (Spiriva with HandiHaler) 18 mcg inhalation capsule, Place 1 capsule (18 mcg) into inhaler and inhale once daily., Disp: 90 capsule, Rfl: 2    Allergies:   No Known Allergies    ROS:  Review of Systems   Constitutional: Negative.    HENT: Negative.     Eyes: Negative.    Respiratory: Negative.  Negative for shortness of breath.    Cardiovascular:  Negative for chest pain, palpitations and leg swelling.   Gastrointestinal: Negative.    Endocrine: Negative.    Genitourinary: Negative.  Negative for pelvic pain.   Musculoskeletal: Negative.    Skin:  Negative for color change, pallor, rash and wound.        Notes spider veins which are not painful   Allergic/Immunologic: Negative.    Neurological:  Negative for dizziness, syncope, facial asymmetry, weakness, light-headedness and numbness.   Psychiatric/Behavioral: Negative.       "    Objective:  Vitals: /69 (BP Location: Right arm)   Pulse 73   Ht 1.651 m (5' 5\")   Wt 68 kg (150 lb)   BMI 24.96 kg/m²     Physical Exam  Constitutional:       Appearance: Normal appearance.   HENT:      Head: Normocephalic and atraumatic.      Nose: Nose normal.      Mouth/Throat:      Mouth: Mucous membranes are moist.   Eyes:      Conjunctiva/sclera: Conjunctivae normal.   Cardiovascular:      Rate and Rhythm: Normal rate.      Pulses: Normal pulses.      Comments: Pop/PT/DP bilaterally required doppling  Pulmonary:      Effort: Pulmonary effort is normal.   Abdominal:      Palpations: Abdomen is soft.   Musculoskeletal:         General: Normal range of motion.      Cervical back: Normal range of motion.   Skin:     General: Skin is warm and dry.      Capillary Refill: Capillary refill takes less than 2 seconds.   Neurological:      General: No focal deficit present.      Mental Status: She is alert and oriented to person, place, and time.   Psychiatric:         Mood and Affect: Mood normal.         Behavior: Behavior normal.         Thought Content: Thought content normal.         Judgment: Judgment normal.     Labs:  Lab Results   Component Value Date    WBC 3.4 (L) 02/28/2023    WBC 4.5 10/12/2021    WBC 6.1 10/11/2021    HGB 14.4 02/28/2023    HGB 12.9 10/12/2021    HGB 11.5 (L) 10/11/2021    HCT 43.8 02/28/2023    HCT 40.5 10/12/2021    HCT 36.1 10/11/2021    MCV 96 02/28/2023    MCV 94 10/12/2021    MCV 94 10/11/2021     02/28/2023     Lab Results   Component Value Date    CREATININE 1.02 04/16/2024    CREATININE 0.97 03/16/2023    CREATININE 1.10 (H) 02/28/2023    BUN 15 04/16/2024    BUN 22 03/16/2023    BUN 17 02/28/2023     04/16/2024     03/16/2023     02/28/2023    K 4.0 04/16/2024    K 4.2 03/16/2023    K 3.3 (L) 02/28/2023     04/16/2024     03/16/2023     02/28/2023    CO2 24 04/16/2024    CO2 21 03/16/2023    CO2 23 02/28/2023     "     Imaging:            Assessment & Plan:  Dora Palmer is 83 y.o. female with history of DMII, AF, HFmrEF, tobacco use, HTN who is presents with weak right foot pulse.    Develops right calf pain after about 500 feet, sitting helps improve the pain after about a minute.    She does note BLE leg/foot cramps when laying down    No numbness or tingling    When the sheets touch her right foot it is painful    Denies wounds to the feet    Today's plan is as follows:  1) Work towards quitting smoking  2) Exercise, walk daily  3) Continue taking Lipitor and aspirin  4) I am referring you to vascular rehab  5) Your next follow-up appt with arterial vascular surgery in 6-12 months    Tigist Saleh DNP, APRN-CNP, AGPCNP-C, FNP-C, AGACNP-BC  Senior Nurse Practitioner, Vascular Surgery  [facility], [facility] [place]  [address] [facility], Office [phone]

## 2024-09-18 NOTE — PATIENT INSTRUCTIONS
It was a pleasure taking care of you today and appreciate your seeing us at our [facility].     Today's plan is as follows:  1) Work towards quitting smoking  2) Exercise, walk daily  3) Continue taking Lipitor and aspirin  4) I am referring you to vascular rehab  5) Your next follow-up appt with arterial vascular surgery in 6-12 months    Please call the office with any questions at [phone].   You can speak to our secretaries or our clinical nurses for specific questions.   For [facility] specific questions, you can also call [phone] or email at [email or web address]  If you need coordinating your appointments and testing you can do these at the  or by calling my office shortly after your visit.

## 2024-09-19 DIAGNOSIS — I73.9 PERIPHERAL VASCULAR DISEASE, UNSPECIFIED (CMS-HCC): Primary | ICD-10-CM

## 2024-09-23 ENCOUNTER — TELEPHONE (OUTPATIENT)
Dept: CARDIAC REHAB | Facility: CLINIC | Age: 84
End: 2024-09-23
Payer: MEDICARE

## 2024-09-23 NOTE — TELEPHONE ENCOUNTER
Patient scheduled assessment for vascular rehab and advised to check insurance prior to starting rehab

## 2024-09-24 ENCOUNTER — TREATMENT (OUTPATIENT)
Dept: PHYSICAL THERAPY | Facility: CLINIC | Age: 84
End: 2024-09-24
Payer: MEDICARE

## 2024-09-24 DIAGNOSIS — M54.50 RIGHT LOW BACK PAIN, UNSPECIFIED CHRONICITY, UNSPECIFIED WHETHER SCIATICA PRESENT: Primary | ICD-10-CM

## 2024-09-24 DIAGNOSIS — M16.9 OSTEOARTHRITIS OF HIP: ICD-10-CM

## 2024-09-24 DIAGNOSIS — M54.50 LOW BACK PAIN: ICD-10-CM

## 2024-09-24 PROCEDURE — 97110 THERAPEUTIC EXERCISES: CPT | Mod: GP | Performed by: PHYSICAL THERAPIST

## 2024-09-24 NOTE — PROGRESS NOTES
"Physical Therapy    Physical Therapy Evaluation and Treatment      Patient Name: Dora Palmer  MRN: [id number]  Today's Date: 9/24/24  Visit 3    Time Entry:   Time Calculation  Start Time: 1450  Stop Time: 1535  Time Calculation (min): 45 min     PT Therapeutic Procedures Time Entry  Therapeutic Exercise Time Entry: 45                   Assessment:  We progressed aerobic exercise component with increased time on stepper and adding UBE 12 total aerobic time at 45-50 RPM    Plan:     Continue POC flexion stabilization program, treat Tps as needed to nnormalize muscle tone      Problem List Items Addressed This Visit             ICD-10-CM    Low back pain - Primary M54.50    Osteoarthritis of hip M16.9      Subjective    Dora Palmer reports R lower leg better when walking  Pain:   0/10 rest, ER lower 3/10 when walking distance      Treatments:  There ex:  Pelvic bridge 2x10  DKC 2x30 sec  Dead bug alternate leg 2 x10  Standing anti-rotation blue tband 5 x 10 sec each direction  Sci fit stepper 8 min at 50 RPM lv 2.2  Deo step over 12\" x 10 each  UBE 4 min at 45 RPM   EDUCATION:     Access Code: [email or web address]  URL: [email or web address]  Date: 09/24/2024  Prepared by: Hollis Lee    Exercises  - Supine Bridge with Pelvic Floor Contraction  - 1 x daily - 7 x weekly - 2 sets - 10 reps - 3 hold    Goals:  1. Increase Dora Palmer ability to walk community distances without R lower calf smptom  2. Eliminate muscular Tps.  3. Increase single leg stance stability to 10 sec  4. independent hep   5. Improve outcome score by 4 points            "

## 2024-09-25 ENCOUNTER — TELEPHONE (OUTPATIENT)
Dept: SLEEP MEDICINE | Facility: HOSPITAL | Age: 84
End: 2024-09-25
Payer: MEDICARE

## 2024-09-25 DIAGNOSIS — R06.83 SNORING: Primary | ICD-10-CM

## 2024-09-25 NOTE — TELEPHONE ENCOUNTER
"Christel reached out to the patient to retest. Patient stated that \" I tried the tet at home and it did not work for me. I need help\".     Patient returned the unit back without being used. I would advised patient to have an in center sleep study since she is not able to complete the home sleep study set up on her own.    Please be advised.     Thank you,     Tati"

## 2024-09-30 NOTE — PROGRESS NOTES
"Physical Therapy    Physical Therapy Evaluation and Treatment      Patient Name: Dora Palmer  MRN: [id number]  Today's Date: 10/1/24  Visit 4    Time Entry:   Time Calculation  Start Time: 1445  Stop Time: 1530  Time Calculation (min): 45 min     PT Therapeutic Procedures Time Entry  Therapeutic Exercise Time Entry: 40                   Assessment:  We progressed aerobic exercise component with increased time on stepper and adding UBE 12 total aerobic time at 45RPM  She did seem to get more SOB during exercises today than last visit and needed totake longer rest breaks.  Plan:     Continue POC flexion stabilization program, treat Tps as needed to normalize muscle tone      Problem List Items Addressed This Visit             ICD-10-CM    Low back pain - Primary M54.50    Osteoarthritis of hip M16.9      Subjective    Dora Palmer reports that she sat hard on toilet and aggravate L hip pain. Feels like a bruise. She had difficulty getting out of chair yesterday;  today better but still painful  Pain:   1-2/10 L hip    Treatments:  There ex:  Pelvic bridge 2x10  DKC 2x30 sec  Standing anti-rotation blue tband 5 x 10 sec each direction  Sci fit stepper 8 min at 50 RPM lv 2.2  Lateral step across 6\" platform x10   Deo step over FWD and lateral 12\" x 10 each  UBE 5min at 45 RPM   EDUCATION:         Goals:  1. Increase Dora Palmer ability to walk community distances without R lower calf smptom  2. Eliminate muscular Tps.  3. Increase single leg stance stability to 10 sec  4. independent hep   5. Improve outcome score by 4 points            "

## 2024-10-01 ENCOUNTER — DOCUMENTATION (OUTPATIENT)
Dept: CARDIAC REHAB | Facility: CLINIC | Age: 84
End: 2024-10-01

## 2024-10-01 ENCOUNTER — TREATMENT (OUTPATIENT)
Dept: PHYSICAL THERAPY | Facility: CLINIC | Age: 84
End: 2024-10-01
Payer: MEDICARE

## 2024-10-01 ENCOUNTER — CLINICAL SUPPORT (OUTPATIENT)
Dept: CARDIAC REHAB | Facility: CLINIC | Age: 84
End: 2024-10-01
Payer: MEDICARE

## 2024-10-01 VITALS
RESPIRATION RATE: 12 BRPM | BODY MASS INDEX: 25.66 KG/M2 | WEIGHT: 154 LBS | DIASTOLIC BLOOD PRESSURE: 64 MMHG | HEART RATE: 56 BPM | SYSTOLIC BLOOD PRESSURE: 144 MMHG | HEIGHT: 65 IN | OXYGEN SATURATION: 98 %

## 2024-10-01 DIAGNOSIS — M54.50 LOW BACK PAIN: ICD-10-CM

## 2024-10-01 DIAGNOSIS — I73.9 PERIPHERAL VASCULAR DISEASE, UNSPECIFIED (CMS-HCC): ICD-10-CM

## 2024-10-01 DIAGNOSIS — M54.50 RIGHT LOW BACK PAIN, UNSPECIFIED CHRONICITY, UNSPECIFIED WHETHER SCIATICA PRESENT: Primary | ICD-10-CM

## 2024-10-01 DIAGNOSIS — M16.9 OSTEOARTHRITIS OF HIP: ICD-10-CM

## 2024-10-01 PROCEDURE — 97110 THERAPEUTIC EXERCISES: CPT | Mod: GP | Performed by: PHYSICAL THERAPIST

## 2024-10-01 RX ORDER — METOPROLOL TARTRATE 100 MG/1
100 TABLET ORAL DAILY
COMMUNITY

## 2024-10-01 ASSESSMENT — PAIN SCALES - GENERAL: PAINLEVEL: 6

## 2024-10-01 ASSESSMENT — PATIENT HEALTH QUESTIONNAIRE - PHQ9
SUM OF ALL RESPONSES TO PHQ9 QUESTIONS 1 & 2: 0
SUM OF ALL RESPONSES TO PHQ QUESTIONS 1-9: 2
6. FEELING BAD ABOUT YOURSELF - OR THAT YOU ARE A FAILURE OR HAVE LET YOURSELF OR YOUR FAMILY DOWN: NOT AT ALL
1. LITTLE INTEREST OR PLEASURE IN DOING THINGS: NOT AT ALL
3. TROUBLE FALLING OR STAYING ASLEEP OR SLEEPING TOO MUCH: SEVERAL DAYS
2. FEELING DOWN, DEPRESSED OR HOPELESS: NOT AT ALL
7. TROUBLE CONCENTRATING ON THINGS, SUCH AS READING THE NEWSPAPER OR WATCHING TELEVISION: NOT AT ALL
9. THOUGHTS THAT YOU WOULD BE BETTER OFF DEAD, OR OF HURTING YOURSELF: NOT AT ALL
4. FEELING TIRED OR HAVING LITTLE ENERGY: SEVERAL DAYS
8. MOVING OR SPEAKING SO SLOWLY THAT OTHER PEOPLE COULD HAVE NOTICED. OR THE OPPOSITE, BEING SO FIGETY OR RESTLESS THAT YOU HAVE BEEN MOVING AROUND A LOT MORE THAN USUAL: NOT AT ALL
5. POOR APPETITE OR OVEREATING: NOT AT ALL
SUM OF ALL RESPONSES TO PHQ QUESTIONS 1-9: 2

## 2024-10-01 NOTE — PROGRESS NOTES
Vascular Rehabilitation Initial Treatment Plan    Name: Dora Palmer  Medical Record Number: [id number]  YOB: 1940  Age: 83 y.o.    Today’s Date: 10/1/2024  Primary Care Physician: Jeremy Huynh MD  Referring Physician: Jeremy Huynh MD  Program Location: [address]  Primary Diagnosis:   1. Peripheral vascular disease, unspecified (CMS-HCC)  Referral to Vascular Rehab         Onset/Date of Diagnosis: 9/8/2024    Initial Assessment, not yet started program.    AACVPR Risk Stratification: High     Falls Risk: High  Psychosocial Assessment     Pre PHQ-9: 2      Sent PH-Q 9 to MD if score > 20: No; score < 20    Pt reported/currently experiencing stress: No  Patient uses stress management skills: No   History of: no history of anxiety or depression  Currently seeing a mental health provider: No  Social Support: Yes, Whom:family  Quality of Life Survey: PAQ  Learning Assessment:  Learning assessment/barriers: None  Preferred learning method: Auditory, Visual, and Writing handout  Barriers: None  Comments:    Stages of Change:Preparation    Psychosocial Plan    Goal Status: Initial Assessment; goals not yet started    Psychosocial Goals: Maintain or lower PH-Q 9 score by discharge    Psychosocial Interventions/Education:  To be done in Vascular Rehab    Initial Assessment: not yet started    Nutrition Assessment:    Hyperlipidemia: Yes     Lipids:   Lab Results   Component Value Date    CHOL 156 04/16/2024    HDL 63.2 04/16/2024    LDLF 101 (H) 07/25/2020    TRIG 83 04/16/2024       Current Dietary Guidelines: Low fat, Low sodium  Barriers to dietary change: yes , Pt likes fried chicken    Diet Habit Survey: Picture Your Plate  Pre:  43  Post: To be done at discharge.    Diabetes Assessment    Lab Results   Component Value Date    HGBA1C 7.0 (H) 04/16/2024       History of Diabetes: No, borderline  Pt monitors BS at home: No   Frequency: 0 /day  FBS range:   "  Hypoglycemic Episodes: No     Weight Management    Height: 165.1 cm (5' 5\")  Weight: 69.9 kg (154 lb)  BMI (Calculated): 25.63  No data recorded    Nutrition Plan    Goal Status: Initial Assessment; goals not yet started    Nutrition Goals: Lipid Goal: HDL>45, LDL <70, Total <180, Trigs <150, Improve Diet Habit Survey score by 5-10 points by discharge, Adapt a low-sodium, DASH diet prior to discharge, Adapt a Mediterranean focused diet prior to discharge, and Learn how to read and interpret nutrition labels prior to discharge    Nutrition Interventions/Education:   To be done in Vascular Rehab    Initial Assessment: not yet started the program.    Exercise Assessment    Home Exercise: Yes  Mode: stationary bike  Frequency: daily  Duration: 8-10 minutes    Exercise Prescription     Exercise Prescription based on:  PAD Protocol   Frequency:  3 days/week   Mode: Treadmill   Duration: 24 total aerobic minutes   Intensity: RPE 12-16  Target HR:  To be calculated after 6 attended sessions.  MET Level: 2.4  Patient wears supplemental O2: No     Modality Workload METs Duration (minutes)   1 Pre-Exercise      2 Treadmill 1.8 mph 2.4 8 :00   3 Rest    2 :00   4 Treadmill 1.8 mph 2.4 8 :00   5 Rest    2 :00   7 Treadmill 1.8 mph 2.4 8:00   6 Post-Exercise   5:00     Resistance Training: No   Home Exercise Prescription given: To be given prior to discharge from program.    Exercise Plan    Goal Status: Initial Assessment; goals not yet started    Exercise Goals: Increase total exercise duration to 30-45 minutes    Exercise Interventions/Education:   To be done in Vascular Rehab    Initial Assessment: not yet started    Other Core Components/Risk Factor Assessment:    Medication adherence  Current Medications:   Medication Documentation Review Audit       Reviewed by Tarun Foster RN (Registered Nurse) on 10/01/24 at 1131      Medication Order Taking? Sig Documenting Provider Last Dose Status   albuterol (ProAir HFA) 90 " mcg/actuation inhaler [id number] Yes Inhale 2 puffs every 4 hours if needed for wheezing or shortness of breath. Risa Quispe MD Taking Active   amLODIPine (Norvasc) 10 mg tablet [id number] Yes Take 1 tablet (10 mg) by mouth once daily. Jeremy Huynh MD Taking Active   aspirin 81 mg chewable tablet [id number] Yes Chew 1 tablet (81 mg) once daily. Historical Provider, MD Taking Active   atorvastatin (Lipitor) 40 mg tablet [id number] Yes Take 1 tablet (40 mg) by mouth once daily. Jeremy Huynh MD Taking Active   carvedilol (Coreg) 25 mg tablet [id number] No Take 1 tablet (25 mg) by mouth 2 times daily (morning and late afternoon).   Patient not taking: Reported on 10/1/2024    Jarod Alexander MD Not Taking Active   cholecalciferol (Vitamin D-3) 50 MCG (2000 UT) tablet [id number] Yes Take 1 tablet (2,000 Units) by mouth once daily. Historical Provider, MD Taking Active   dorzolamide (Trusopt) 2 % ophthalmic solution [id number] Yes Administer 1 drop into both eyes 2 times a day. Shoaib Diego MD Taking Active   esomeprazole (NexIUM) 40 mg DR capsule [id number] Yes Take 1 capsule (40 mg) by mouth once daily. Historical Provider, MD Taking Active   furosemide (Lasix) 40 mg tablet [id number] Yes Take 1 tablet (40 mg) by mouth once daily. Jeremy Huynh MD Taking Active   latanoprost (Xalatan) 0.005 % ophthalmic solution [id number] Yes INSTILL 1 DROP INTO BOTH EYES AT BEDTIME Shoaib Diego MD Taking Active   losartan (Cozaar) 100 mg tablet [id number] Yes Take 1 tablet (100 mg) by mouth once daily. Jeremy Huynh MD Taking Active   metoprolol tartrate (Lopressor) 100 mg tablet [id number] Yes Take 1 tablet (100 mg) by mouth once daily. Historical Provider, MD Taking Active   multivitamin/iron/folic acid (CENTRUM COMPLETE ORAL) [id number] Yes Take 1 tablet by mouth once daily. Historical Provider, MD Taking Active   prazosin (Minipress) 1 mg capsule [id number] Yes TAKE 1 CAPSULE BY MOUTH TWICE  DAILY Jarod Alexander MD  Taking Active   tiotropium (Spiriva with HandiHaler) 18 mcg inhalation capsule [id number] Yes Place 1 capsule (18 mcg) into inhaler and inhale once daily. Jeremy Huynh MD Taking Active                                 Medication compliance: Yes   Uses pill box/organizer: Yes    Carries medication list: Yes     Blood Pressure Management  History of Hypertension: Yes   Medication Changes: Yes   Resting BP:  Visit Vitals  /64 (BP Location: Left arm, Patient Position: Sitting, BP Cuff Size: Small adult)   Pulse 56   Resp 12        Heart Failure Management  Hx of Heart Failure: Yes;   Type (selection): HFrEF  Most recent EF:       Onset of heart failure diagnosis: 2020  Last heart failure hospitalization: 2020  Number of HF admissions per year: 0    Symptoms: Shortness of breath and weak  Is there a family Hx of HF: Yes   Does patient obtain daily weight  almost daily        Heart Failure Reassessment: Initial Treatment Plan. Will reassess in 30 days.    Heart Failure Goals: Able to verbalize signs and symptoms of fluid retention and when to contact MD, Adapt a low sodium diet and verbalize guidelines, and Obtain daily weight and verbalize proper method of obtaining weights    Smoking/Tobacco Assessment  Social History     Tobacco Use   Smoking Status Every Day    Types: Cigarettes   Smokeless Tobacco Never       Other Core Component Plan    Goal Status: Initial Assessment; goals not yet started    Other Core Component Goals: Achieve resting BP of < 130/80 by discharge    Other Core Component Interventions/Education:   To be done in Vascular Rehab    Initial Assessment: not yet started     Individual Patient Goals:    Be able to go on vacation by December  Be able to walk without pain for 30 minutes by discharge    Goal Status: Initial Assessment; goals not yet started    Staff Comments:  Can come MWF at 11:00.  She was advised to call her insurance.  Pt wants to call insurance first before starting the  program.  Codes given.    Rehab Staff Signature: Tarun Foster RN

## 2024-10-07 ENCOUNTER — TELEPHONE (OUTPATIENT)
Dept: CARDIAC REHAB | Facility: CLINIC | Age: 84
End: 2024-10-07
Payer: MEDICARE

## 2024-10-07 NOTE — PROGRESS NOTES
Dora Palmer   10/07/24     Called patient regarding scheduling Cardiac Rehab start date at [facility]. Patient is going on 2 vacations in this year and requested to start in 2025. Rehab staff will reach out to discuss the schedule.    Katherine Fuller EP

## 2024-10-09 NOTE — PROGRESS NOTES
"Physical Therapy    Physical Therapy Evaluation and Treatment      Patient Name: Dora Palmer  MRN: [id number]  Today's Date: 10/1/24  Visit 4    Time Entry:   Time Calculation  Start Time: 1435  Stop Time: 1520  Time Calculation (min): 45 min     PT Therapeutic Procedures Time Entry  Therapeutic Exercise Time Entry: 40                   Assessment:  Dora Palmer has proprioception awareness deficit for hip hip pelvic bridging. She needs much tactile and verbal cuing to clear hips from table via strong hip extension. At the end of the session she was able to get up off a std height chair without pushing off armrests with hands  Plan:     Continue POC flexion stabilization program, treat Tps as needed to normalize muscle tone      Problem List Items Addressed This Visit             ICD-10-CM    Low back pain - Primary M54.50    Osteoarthritis of hip M16.9      Subjective    Dora Palmer reports that she feels a cold coming on. Not as much energy as usual.  Pain:   1-2/10 L hip    Treatments:  There ex:  Pelvic bridge 2x10 high hips   DKC 2x30 sec  ASLR x10 hold last rep 10 sec  L to R step over stretch  Standing anti-rotation blue tband 5 x 10 sec each direction  Sci fit stepper 8 min at 50 RPM lv 2.2  Lateral step across 6\" platform x10   Deo step over FWD and lateral 12\" x 10 each  UBE 5min at 45 RPM   EDUCATION:   Emphasis today on sit to stand power. Dora Palmer was able to achieve sit to stand without push off after practicing the foot stomp and getting up from elevated seat      Goals:  1. Increase Dora Palmer ability to walk community distances without R lower calf smptom  2. Eliminate muscular Tps.  3. Increase single leg stance stability to 10 sec  4. independent hep   5. Improve outcome score by 4 points            "

## 2024-10-10 ENCOUNTER — TREATMENT (OUTPATIENT)
Dept: PHYSICAL THERAPY | Facility: CLINIC | Age: 84
End: 2024-10-10
Payer: MEDICARE

## 2024-10-10 DIAGNOSIS — M16.9 OSTEOARTHRITIS OF HIP: ICD-10-CM

## 2024-10-10 DIAGNOSIS — M54.50 LOW BACK PAIN: ICD-10-CM

## 2024-10-10 DIAGNOSIS — M54.50 RIGHT LOW BACK PAIN, UNSPECIFIED CHRONICITY, UNSPECIFIED WHETHER SCIATICA PRESENT: Primary | ICD-10-CM

## 2024-10-10 PROCEDURE — 97110 THERAPEUTIC EXERCISES: CPT | Mod: GP | Performed by: PHYSICAL THERAPIST

## 2024-10-16 ENCOUNTER — APPOINTMENT (OUTPATIENT)
Dept: PRIMARY CARE | Facility: CLINIC | Age: 84
End: 2024-10-16
Payer: MEDICARE

## 2024-10-16 VITALS
BODY MASS INDEX: 25.76 KG/M2 | WEIGHT: 154.6 LBS | DIASTOLIC BLOOD PRESSURE: 80 MMHG | SYSTOLIC BLOOD PRESSURE: 140 MMHG | HEART RATE: 62 BPM | HEIGHT: 65 IN

## 2024-10-16 DIAGNOSIS — R09.89 BRUIT OF LEFT CAROTID ARTERY: ICD-10-CM

## 2024-10-16 DIAGNOSIS — I73.9 PERIPHERAL VASCULAR DISEASE (CMS-HCC): ICD-10-CM

## 2024-10-16 DIAGNOSIS — I50.20 HFREF (HEART FAILURE WITH REDUCED EJECTION FRACTION): ICD-10-CM

## 2024-10-16 DIAGNOSIS — E03.9 HYPOTHYROIDISM, UNSPECIFIED TYPE: ICD-10-CM

## 2024-10-16 DIAGNOSIS — E11.51 TYPE 2 DIABETES MELLITUS WITH DIABETIC PERIPHERAL ANGIOPATHY WITHOUT GANGRENE, WITHOUT LONG-TERM CURRENT USE OF INSULIN (MULTI): ICD-10-CM

## 2024-10-16 DIAGNOSIS — I10 PRIMARY HYPERTENSION: Primary | Chronic | ICD-10-CM

## 2024-10-16 DIAGNOSIS — J44.9 CHRONIC OBSTRUCTIVE PULMONARY DISEASE, UNSPECIFIED COPD TYPE (MULTI): ICD-10-CM

## 2024-10-16 PROBLEM — I48.91 ATRIAL FIBRILLATION (MULTI): Status: RESOLVED | Noted: 2023-03-04 | Resolved: 2024-10-16

## 2024-10-16 PROCEDURE — 1160F RVW MEDS BY RX/DR IN RCRD: CPT | Performed by: INTERNAL MEDICINE

## 2024-10-16 PROCEDURE — 1159F MED LIST DOCD IN RCRD: CPT | Performed by: INTERNAL MEDICINE

## 2024-10-16 PROCEDURE — G2211 COMPLEX E/M VISIT ADD ON: HCPCS | Performed by: INTERNAL MEDICINE

## 2024-10-16 PROCEDURE — 3079F DIAST BP 80-89 MM HG: CPT | Performed by: INTERNAL MEDICINE

## 2024-10-16 PROCEDURE — 3077F SYST BP >= 140 MM HG: CPT | Performed by: INTERNAL MEDICINE

## 2024-10-16 PROCEDURE — 4004F PT TOBACCO SCREEN RCVD TLK: CPT | Performed by: INTERNAL MEDICINE

## 2024-10-16 PROCEDURE — 99214 OFFICE O/P EST MOD 30 MIN: CPT | Performed by: INTERNAL MEDICINE

## 2024-10-16 ASSESSMENT — ENCOUNTER SYMPTOMS
DEPRESSION: 0
LOSS OF SENSATION IN FEET: 0
CONSTITUTIONAL NEGATIVE: 1
OCCASIONAL FEELINGS OF UNSTEADINESS: 0

## 2024-10-16 NOTE — PROGRESS NOTES
"Patient ID: Dora Palmer is a 83 y.o. female who presents for Follow-up.    /80   Pulse 62   Ht 1.651 m (5' 5\")   Wt 70.1 kg (154 lb 9.6 oz)   BMI 25.73 kg/m²     HPI      HTN ON MEDICATIONS CONTROLLED   NO CP, NO SOB , NO PND, NO ORTHOPNEA  NO PALPITATION , NO HEADACHE , NO LEG SWELLING       DM2 WITH PVD   PT OFTEN TIME GETS   LEG TIGHTNESS /CRAMPS   WHEN WALK FOR CONSIDERABLE PERIOD OF TIME   PT SEEN VASCULAR       DM2 WITH NEPHROPATHY   ON LOSARTAN 100MG A DAY       STILL SMOKING       COPD USES   SPIRIVA INHALER       Subjective     Review of Systems   Constitutional: Negative.    All other systems reviewed and are negative.      Objective     Physical Exam  Vitals and nursing note reviewed.   Neck:      Vascular: Carotid bruit present.   Cardiovascular:      Rate and Rhythm: Normal rate. Rhythm irregular.      Pulses: Normal pulses.      Heart sounds: Normal heart sounds. No murmur heard.  Pulmonary:      Breath sounds: Examination of the right-upper field reveals decreased breath sounds. Examination of the left-upper field reveals decreased breath sounds. Examination of the right-middle field reveals decreased breath sounds. Examination of the left-middle field reveals decreased breath sounds. Examination of the right-lower field reveals decreased breath sounds. Examination of the left-lower field reveals decreased breath sounds. Decreased breath sounds present.   Abdominal:      Palpations: There is no mass.   Musculoskeletal:      Right lower leg: No edema.      Left lower leg: No edema.   Skin:     Capillary Refill: Capillary refill takes more than 3 seconds.   Neurological:      General: No focal deficit present.      Mental Status: She is oriented to person, place, and time. Mental status is at baseline.   Psychiatric:         Mood and Affect: Mood normal.         Behavior: Behavior normal.         Thought Content: Thought content normal.         Judgment: Judgment normal.         Lab " Results   Component Value Date    WBC 3.4 (L) 02/28/2023    HGB 14.4 02/28/2023    HCT 43.8 02/28/2023    MCV 96 02/28/2023     02/28/2023           Problem List Items Addressed This Visit       Chronic obstructive pulmonary disease (Multi)    HFrEF (heart failure with reduced ejection fraction)    Hypothyroidism    Hypertension - Primary    Peripheral vascular disease (CMS-HCC)    Bruit of left carotid artery    Type 2 diabetes mellitus with diabetic peripheral angiopathy without gangrene, without long-term current use of insulin (Multi)        A/P       HIGH DOSE INFLUENZA VACCINE   FOLLOW UP ON 04/18/2025 FOR   SUBSEQUENT MEDICARE WELLNESS VISIT

## 2024-10-23 ENCOUNTER — APPOINTMENT (OUTPATIENT)
Dept: OBSTETRICS AND GYNECOLOGY | Facility: CLINIC | Age: 84
End: 2024-10-23
Payer: MEDICARE

## 2024-10-24 ENCOUNTER — APPOINTMENT (OUTPATIENT)
Dept: PHYSICAL THERAPY | Facility: CLINIC | Age: 84
End: 2024-10-24
Payer: MEDICARE

## 2024-11-27 DIAGNOSIS — I10 HYPERTENSION, UNSPECIFIED TYPE: ICD-10-CM

## 2024-11-27 RX ORDER — AMLODIPINE BESYLATE 10 MG/1
10 TABLET ORAL DAILY
Qty: 100 TABLET | Refills: 2 | Status: SHIPPED | OUTPATIENT
Start: 2024-11-27

## 2024-12-06 DIAGNOSIS — R60.0 EDEMA OF BOTH LEGS: ICD-10-CM

## 2024-12-09 RX ORDER — FUROSEMIDE 40 MG/1
40 TABLET ORAL DAILY
Qty: 100 TABLET | Refills: 1 | Status: SHIPPED | OUTPATIENT
Start: 2024-12-09

## 2024-12-11 ENCOUNTER — TELEPHONE (OUTPATIENT)
Dept: PRIMARY CARE | Facility: CLINIC | Age: 84
End: 2024-12-11

## 2024-12-18 ENCOUNTER — LAB (OUTPATIENT)
Dept: LAB | Facility: LAB | Age: 84
End: 2024-12-18
Payer: MEDICARE

## 2024-12-18 DIAGNOSIS — Z11.1 SCREENING FOR TUBERCULOSIS: Primary | ICD-10-CM

## 2024-12-18 DIAGNOSIS — Z11.1 SCREENING FOR TUBERCULOSIS: ICD-10-CM

## 2024-12-18 PROCEDURE — 86481 TB AG RESPONSE T-CELL SUSP: CPT

## 2024-12-18 PROCEDURE — 36415 COLL VENOUS BLD VENIPUNCTURE: CPT

## 2024-12-20 LAB
NIL(NEG) CONTROL SPOT COUNT: NORMAL
PANEL A SPOT COUNT: 0
PANEL B SPOT COUNT: 0
POS CONTROL SPOT COUNT: NORMAL
T-SPOT. TB INTERPRETATION: NEGATIVE

## 2025-01-02 ENCOUNTER — OFFICE VISIT (OUTPATIENT)
Dept: PULMONOLOGY | Facility: CLINIC | Age: 85
End: 2025-01-02
Payer: MEDICARE

## 2025-01-02 VITALS
SYSTOLIC BLOOD PRESSURE: 144 MMHG | WEIGHT: 147 LBS | OXYGEN SATURATION: 99 % | DIASTOLIC BLOOD PRESSURE: 75 MMHG | TEMPERATURE: 95.9 F | HEART RATE: 56 BPM | BODY MASS INDEX: 24.46 KG/M2

## 2025-01-02 DIAGNOSIS — J44.9 CHRONIC OBSTRUCTIVE PULMONARY DISEASE, UNSPECIFIED COPD TYPE (MULTI): Primary | ICD-10-CM

## 2025-01-02 DIAGNOSIS — R06.2 WHEEZING: ICD-10-CM

## 2025-01-02 PROCEDURE — 99213 OFFICE O/P EST LOW 20 MIN: CPT | Performed by: STUDENT IN AN ORGANIZED HEALTH CARE EDUCATION/TRAINING PROGRAM

## 2025-01-02 PROCEDURE — 1126F AMNT PAIN NOTED NONE PRSNT: CPT | Performed by: STUDENT IN AN ORGANIZED HEALTH CARE EDUCATION/TRAINING PROGRAM

## 2025-01-02 PROCEDURE — 3078F DIAST BP <80 MM HG: CPT | Performed by: STUDENT IN AN ORGANIZED HEALTH CARE EDUCATION/TRAINING PROGRAM

## 2025-01-02 PROCEDURE — 3077F SYST BP >= 140 MM HG: CPT | Performed by: STUDENT IN AN ORGANIZED HEALTH CARE EDUCATION/TRAINING PROGRAM

## 2025-01-02 RX ORDER — FLUTICASONE FUROATE, UMECLIDINIUM BROMIDE AND VILANTEROL TRIFENATATE 200; 62.5; 25 UG/1; UG/1; UG/1
1 POWDER RESPIRATORY (INHALATION)
Qty: 1 EACH | Refills: 11 | Status: SHIPPED | OUTPATIENT
Start: 2025-01-02

## 2025-01-02 ASSESSMENT — ENCOUNTER SYMPTOMS
ABDOMINAL DISTENTION: 0
CHILLS: 0
UNEXPECTED WEIGHT CHANGE: 0
ARTHRALGIAS: 0
PALPITATIONS: 0
ABDOMINAL PAIN: 0
FEVER: 0

## 2025-01-02 ASSESSMENT — PAIN SCALES - GENERAL: PAINLEVEL_OUTOF10: 0-NO PAIN

## 2025-01-02 ASSESSMENT — COPD QUESTIONNAIRES
QUESTION8_ENERGYLEVEL: 3
QUESTION7_SLEEPQUALITY: 4
QUESTION2_CHESTPHLEGM: 4
CAT_TOTALSCORE: 31
QUESTION4_WALKINCLINE: 4
QUESTION6_LEAVINGHOUSE: 4
QUESTION3_CHESTTIGHTNESS: 3
QUESTION5_HOMEACTIVITIES: 4
QUESTION1_COUGHFREQUENCY: 5 - I COUGH ALL THE TIME

## 2025-01-02 NOTE — PATIENT INSTRUCTIONS
Thank you for visiting the Pulmonary Clinic today.   Your breathing medications: start trelegy  Referrals: clinical pharmacy referral    Return in 3-4 months or sooner   If you have questions or concerns, call [phone] (option 4)

## 2025-01-02 NOTE — PROGRESS NOTES
Department of Medicine I Division of Pulmonary, Critical Care, and Sleep Medicine   [id number] [place] [address] [address] [place] [address]  Phone: [phone]  Fax: [phone]    History of Present Illness   Dora Palmer is a 84 y.o. female here for pulmonary follow up     1/2/2024   Still having coughing and rattling   Bringing up clear mucous   Did have a mechanical fall in November --did not hurt herself   No bronchitis episodes since last time  Was unable to set up the home sleep study when it was sent to her so she sent it back, I had ordered an in lab sleep study which she hasn't completed yet     CAT: 31   MMRC: 1   Pulmonary medications: albuterol, spiriva    8/2024   In the last year has noticed increased wheezing when she lays down at night as opposed to the day time   Triggers for dyspnea:  walking up steps, biking, cleaning  Denies recurrent bronchitis   Also notes nasal congestion at night --uses flonase   Denies any seasonal allergies   Occasional cough--dry   Does snore at night   Has receive POLK for her thyroid in the past     CAT: 18   Pulmonary medications: spiriva (as needed),  albuterol as needed   Review of Systems  Review of Systems   Constitutional:  Negative for chills, fever and unexpected weight change.   Respiratory:          As per HPI    Cardiovascular:  Negative for chest pain, palpitations and leg swelling.   Gastrointestinal:  Negative for abdominal distention and abdominal pain.   Musculoskeletal:  Negative for arthralgias and gait problem.   Skin:  Negative for rash.     All other review of systems are negative and/or non-contributory.    Past Medical History   She has a past medical history of Glaucoma, Personal history of other diseases of the circulatory system, Personal history of other diseases of the nervous system and sense organs, Personal history of other endocrine, nutritional and metabolic disease, Pseudophakia, both eyes, Radiculopathy, lumbar region  (09/13/2016), Supraventricular tachycardia (CMS-[facility]), and Unsteadiness on feet (07/08/2020).    Immunizations     Immunization History   Administered Date(s) Administered    Influenza, seasonal, injectable 03/03/2023    Moderna SARS-CoV-2 Vaccination 12/31/2021    Pfizer Purple Cap SARS-CoV-2 03/06/2021    Pneumococcal Conjugate PCV 7 03/03/2023    Pneumococcal conjugate vaccine, 13-valent (PREVNAR 13) 12/15/2014    Pneumococcal polysaccharide vaccine, 23-valent, age 2 years and older (PNEUMOVAX 23) 11/10/2009    Tdap vaccine, age 7 year and older (BOOSTRIX, ADACEL) 11/10/2009, 12/14/2023       Medications and Allergies     Current Outpatient Medications   Medication Instructions    albuterol (ProAir HFA) 90 mcg/actuation inhaler 2 puffs, inhalation, Every 4 hours PRN    amLODIPine (NORVASC) 10 mg, oral, Daily    aspirin 81 mg chewable tablet 1 tablet, Daily    atorvastatin (LIPITOR) 40 mg, oral, Daily    carvedilol (COREG) 25 mg, oral, 2 times daily (morning and late afternoon)    cholecalciferol (Vitamin D-3) 50 MCG (2000 UT) tablet 1 tablet, Daily    dorzolamide (Trusopt) 2 % ophthalmic solution 1 drop, Both Eyes, 2 times daily    esomeprazole (NexIUM) 40 mg DR capsule 1 capsule, Daily    furosemide (LASIX) 40 mg, oral, Daily    latanoprost (Xalatan) 0.005 % ophthalmic solution 1 drop, Both Eyes, Nightly    losartan (COZAAR) 100 mg, oral, Daily    metoprolol tartrate (LOPRESSOR) 100 mg, Daily    multivitamin/iron/folic acid (CENTRUM COMPLETE ORAL) 1 tablet, Daily    prazosin (MINIPRESS) 1 mg, oral, 2 times daily    tiotropium (SPIRIVA WITH HANDIHALER) 18 mcg, inhalation, Daily RT      Patient has no known allergies.    Social History   She reports that she has been smoking cigarettes. She has never used smokeless tobacco. She reports current alcohol use. She reports that she does not use drugs.    Smoking History: still smoking,  1-2 cigarettes a day.  Smoking for about 60 years.   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     Family History     Family History   Problem Relation Name Age of Onset    Diabetes Mother      Hypertension Mother      Diabetes Son             Surgical History   She has a past surgical history that includes Other surgical history (2013); Colonoscopy (2013); Tonsillectomy (2014); and Cataract extraction (Bilateral).    Physical Exam     Vitals:    25 1126   BP: 144/75   Pulse: 56   Temp: 35.5 °C (95.9 °F)   SpO2: 99%           Physical Exam  Vitals reviewed.   Constitutional:       General: She is awake.   Cardiovascular:      Rate and Rhythm: Normal rate and regular rhythm.   Pulmonary:      Effort: Pulmonary effort is normal.      Breath sounds: Rhonchi present.   Abdominal:      General: Bowel sounds are normal.      Palpations: Abdomen is soft.      Tenderness: There is no abdominal tenderness.   Neurological:      Mental Status: She is alert and oriented to person, place, and time.   Psychiatric:         Attention and Perception: Attention and perception normal.         Behavior: Behavior normal.          Results   Pulmonary Function Tests:  8/15/2024   PFT:  FEV1/FVC:  67   post FEV1: 77  + BD response,   T DLCO: 56%     Chest Radiograph:  XR chest 2 views     Narrative  Interpreted By:  RAY DAMON MD  MRN: [id number]  Patient Name: ELVIS WHITE    STUDY:  TH CHEST 2 VIEW PA AND LAT;  3/31/2023 11:29 am    INDICATION:  COUGH , WHEEZING.    COMPARISON:  2023    ACCESSION NUMBER(S):  [id number]    ORDERING CLINICIAN:  MARAH TELLEZ    FINDINGS:  There is no focal lung consolidation or effusion. There is no edema.  The cardiac silhouette is within normal limits for size.    Impression  No acute cardiopulmonary process.      Chest CT Scan:  No results found for this or any previous visit from the past 365 days.       ECHO:  No results found for this or any previous  "visit from the past 365 days.       Labs:  Lab Results   Component Value Date    WBC 3.4 (L) 02/28/2023    HGB 14.4 02/28/2023    HCT 43.8 02/28/2023    MCV 96 02/28/2023     02/28/2023       Lab Results   Component Value Date    CREATININE 1.02 04/16/2024    BUN 15 04/16/2024     04/16/2024    K 4.0 04/16/2024     04/16/2024    CO2 24 04/16/2024        Lab Results   Component Value Date    SEDRATE 16 07/20/2021        IgE: No results found for: \"IGE\"   Respiratory Allergy Panel:  AEC:   Eosinophils Absolute (x10E9/L)   Date Value   10/12/2021 0.20   06/05/2021 0.00     Eosinophils % (%)   Date Value   10/12/2021 4.5   06/05/2021 0.0       Cultures:  No results found for: \"AFBCX\"   No results found for: \"RESPCULTCYFI\"    No results found for the last 90 days.  C     Assessment and Plan       COPD stage II  class B , also with BD response  Snoring--high risk MUNIRA       Recommendations:   Start Trelegy 200   Can use albtuterol bid in the interim to help with pulmonary clearance   Was hesitant to pursue sleep testing again--advised to see how her symptoms do with the addition of the new inhaler--if most of her symptoms are improved then we can hold off on the in lab study, if not then would recommend to pursue it       Risa Quispe MD  01/02/2025    "

## 2025-01-08 ENCOUNTER — TELEPHONE (OUTPATIENT)
Dept: CARDIAC REHAB | Facility: CLINIC | Age: 85
End: 2025-01-08
Payer: MEDICARE

## 2025-01-08 NOTE — TELEPHONE ENCOUNTER
Dora Palmer  01/08/25    Called patient to schedule start date for the rehab sessions for vascular rehab at [facility]. Unable to leave a message.    Tarun Foster RN

## 2025-01-09 ENCOUNTER — APPOINTMENT (OUTPATIENT)
Dept: OPHTHALMOLOGY | Facility: CLINIC | Age: 85
End: 2025-01-09
Payer: MEDICARE

## 2025-01-09 DIAGNOSIS — H40.1131 PRIMARY OPEN-ANGLE GLAUCOMA, BILATERAL, MILD STAGE: Primary | ICD-10-CM

## 2025-01-09 DIAGNOSIS — Z96.1 PSEUDOPHAKIA OF BOTH EYES: ICD-10-CM

## 2025-01-09 DIAGNOSIS — I10 HYPERTENSION, UNSPECIFIED TYPE: ICD-10-CM

## 2025-01-09 PROCEDURE — 92133 CPTRZD OPH DX IMG PST SGM ON: CPT | Performed by: OPHTHALMOLOGY

## 2025-01-09 PROCEDURE — 99214 OFFICE O/P EST MOD 30 MIN: CPT | Performed by: OPHTHALMOLOGY

## 2025-01-09 PROCEDURE — 92083 EXTENDED VISUAL FIELD XM: CPT | Performed by: OPHTHALMOLOGY

## 2025-01-09 ASSESSMENT — REFRACTION_WEARINGRX
OD_ADD: +2.50
OD_CYLINDER: -0.50
OS_SPHERE: +0.25
OD_SPHERE: -0.75
OS_ADD: +2.50
OD_AXIS: 075
OS_CYLINDER: -0.50
SPECS_TYPE: BIFOCAL
OS_AXIS: 090

## 2025-01-09 ASSESSMENT — EXTERNAL EXAM - LEFT EYE: OS_EXAM: NORMAL

## 2025-01-09 ASSESSMENT — CONF VISUAL FIELD
OS_INFERIOR_NASAL_RESTRICTION: 0
OD_SUPERIOR_NASAL_RESTRICTION: 0
OD_SUPERIOR_TEMPORAL_RESTRICTION: 0
OS_SUPERIOR_TEMPORAL_RESTRICTION: 0
OS_NORMAL: 1
OD_INFERIOR_NASAL_RESTRICTION: 0
OS_INFERIOR_TEMPORAL_RESTRICTION: 0
OS_SUPERIOR_NASAL_RESTRICTION: 0
OD_INFERIOR_TEMPORAL_RESTRICTION: 0
OD_NORMAL: 1

## 2025-01-09 ASSESSMENT — VISUAL ACUITY
CORRECTION_TYPE: GLASSES
OD_CC+: +2
OS_CC: 20/25
OD_CC: 20/25
METHOD: SNELLEN - LINEAR

## 2025-01-09 ASSESSMENT — SLIT LAMP EXAM - LIDS: COMMENTS: NORMAL

## 2025-01-09 ASSESSMENT — CUP TO DISC RATIO
OS_RATIO: 0.75
OD_RATIO: 0.6

## 2025-01-09 ASSESSMENT — TONOMETRY
OD_IOP_MMHG: 14
OS_IOP_MMHG: 14
IOP_METHOD: GOLDMANN APPLANATION

## 2025-01-09 ASSESSMENT — PACHYMETRY
OS_CT(UM): 662
OD_CT(UM): 666

## 2025-01-09 NOTE — PROGRESS NOTES
Visual Acuity (Snellen - Linear)         Right Left    Dist cc 20/25 +2 20/25      Correction: Glasses          Tonometry       Tonometry (Goldmann Applanation, 8:37 AM)         Right Left    Pressure 14 14                  Assessment/Plan   Last dilated:  1/9/25  specular microscopy OD:  2,698 (3/2023)    1.  Primary Open-Angle Glaucoma OU:  /662 Tm 18/19.  IOPs should be in the low teens given progression at upper teens.   s/p combined with KDB OD 2/14/23:  pre-op VA 20/60, IOP 18 mmHg.  Toric @ approx 95 degrees and target 97.  s/p combined with KDB OS 5/10/23:  pre-op VA 20/50, IOP 15 mmHg.  HVF OD hints at progression, but RNFL OD is stable.  IOPs are outside of range.  Will advance Rx.  Timolol -> no effect.  Brimonidine -> 2 mmHg OD, but nothing OS.  Goal for OD is low teens.  IOP borderline today.     Plan:  cont latanoprost OU QHS               cont dorzolamide 0.2% OU BID               f/u 2 months to recheck IOP    2.  Pseudophakia (PCIOL - toric) OU:  no visually significant PCO     Plan:  monitor    3.  RLL Nevus:  pt would like removal     Plan:  as per Dr. Gomez

## 2025-01-10 RX ORDER — PRAZOSIN HYDROCHLORIDE 1 MG/1
1 CAPSULE ORAL 2 TIMES DAILY
Qty: 200 CAPSULE | Refills: 2 | Status: SHIPPED | OUTPATIENT
Start: 2025-01-10

## 2025-02-05 ENCOUNTER — TELEPHONE (OUTPATIENT)
Dept: PHARMACY | Facility: HOSPITAL | Age: 85
End: 2025-02-05
Payer: MEDICARE

## 2025-02-05 DIAGNOSIS — J43.9 PULMONARY EMPHYSEMA, UNSPECIFIED EMPHYSEMA TYPE (MULTI): Primary | ICD-10-CM

## 2025-02-05 NOTE — TELEPHONE ENCOUNTER
Population Health: Outreach by Ambulatory Pharmacy Team    Patient: Dora Palmer  Primary Care Provider (PCP): Jeremy Huynh MD  Payor: [facility]  Reason: Adherence  Medication(s): Losartan 100 mg  Outcome: Patient Reached: Barriers Identified, Patient claims adherence to all medications; however, states delivery can be late at times. Patient also stated the medications are very expensive. We reached out to the provider for a referral to our clinical pharmacy team for patient assistance. Patient may possibly benefit from  home delivery due to some late delivery times, and this should be discussed at the time of clinical pharmacy appointment. No other barriers identified.     PATRICIA STEPHENSON , Pharmacy Student    [facility]  Pharmacy Resident

## 2025-02-05 NOTE — TELEPHONE ENCOUNTER
I reviewed the progress note and agree with the resident’s findings and plans as written. Case discussed with resident.    Judith Harris, PharmD

## 2025-02-12 ENCOUNTER — TELEMEDICINE (OUTPATIENT)
Dept: PHARMACY | Facility: HOSPITAL | Age: 85
End: 2025-02-12
Payer: MEDICARE

## 2025-02-12 DIAGNOSIS — J43.9 PULMONARY EMPHYSEMA, UNSPECIFIED EMPHYSEMA TYPE (MULTI): ICD-10-CM

## 2025-02-12 NOTE — ASSESSMENT & PLAN NOTE
Patient with COPD that is stable and needs further observation. Based on CAT score, exacerbation history, and eosinophil count, patient is GOLD Group B. Patient is on Trelegy and Spiriva is still on the medication list. Confirmed with patient that she is not using Spiriva and emphasized to stay off of it while on Trelegy due to duplication in therapy. She reports she is still coughing and isn't sure whether it has improved. Will apply for  PAP for Trelegy when patient sends her financial documents.    Medication Changes:  CONTINUE  Trelegy Ellipta 200-62.5-25 mcg 1 puff daily

## 2025-02-12 NOTE — PROGRESS NOTES
Clinical Pharmacy Appointment    Patient ID: Dora Palmer is a 84 y.o. female who presents for COPD.    Pt is here for First appointment.     Referring Provider: Jeremy Huynh MD  PCP: Jeremy Huynh MD   Last visit with PCP: 10/16/24   Next visit with PCP: 4/21/25    Subjective   HPI  COPD  Patient has been diagnosed with: Emphysema  Does patient see pulmonology: Yes    Date: 1/2/25  has had PFT's completed in last 2 years    Current Regimen  Trelegy Ellipta 200-62.5-25 mcg 1 puff daily    Clarifications to above regimen: None   Adverse Effects: None   Appropriate technique? Yes    Historical Treatment  Spiriva    Symptom Management  Current symptoms: cough  Triggers: N/A  Alleviating factors: N/A    Exacerbation Hx  When was your last hospitalization for an exacerbation? None  When was the last time you were treated with antibiotics and/or steroids? >1 year ago     Rescue Inhaler Use  How often do you use your rescue inhaler? Hasn't used since starting Trelegy    Immunization History:  Influenza: Date: 3/3/2023  PCV20: 3/3/23  COVID: 2021  RSV: None    Smoking History  She currently smokes  1-2  cigarettes per day.    Medication Reconciliation:  Changed: N/A  Added: N/A  Discontinued: metoprolol (on carvedilol), Spiriva    Drug Interactions  No relevant drug interactions were noted.    Medication System Management  [facility]  Adherence/Organization: Deliveries can be late from [facility] so she will go a few days without meds occasionally. If approved for  PAP, patient will likely change to [facility], which can fill 90-day supplies too.  Affordability/Accessibility: [facility] Patient Assistance Program (PAP)    Application for program to be submitted for the following medications: [facility] Permanent Address: [facility]   Prescription Insurance:  Yes   Members of Household: 1   Files Taxes: Yes     Patient will be faxing  financial information to pharmacist directly at [phone].    Patient verbally reports monthly or yearly income which is less than 400% federal poverty level    Patient aware this process may take up to 6 weeks.     If approved medication must be filled through [facility] and MEDICATION WILL BE MAILED TO PATIENT.    Objective   No Known Allergies  Social History     Social History Narrative    Not on file      Medication Review  Current Outpatient Medications   Medication Instructions    albuterol (ProAir HFA) 90 mcg/actuation inhaler 2 puffs, inhalation, Every 4 hours PRN    amLODIPine (NORVASC) 10 mg, oral, Daily    aspirin 81 mg chewable tablet 1 tablet, Daily    atorvastatin (LIPITOR) 40 mg, oral, Daily    carvedilol (COREG) 25 mg, oral, 2 times daily (morning and late afternoon)    cholecalciferol (Vitamin D-3) 50 MCG (2000 UT) tablet 1 tablet, Daily    dorzolamide (Trusopt) 2 % ophthalmic solution 1 drop, Both Eyes, 2 times daily    esomeprazole (NexIUM) 40 mg DR capsule 1 capsule, Daily    fluticasone-umeclidin-vilanter (Trelegy Ellipta) 200-62.5-25 mcg blister with device 1 puff, inhalation, Daily before breakfast    furosemide (LASIX) 40 mg, oral, Daily    latanoprost (Xalatan) 0.005 % ophthalmic solution 1 drop, Both Eyes, Nightly    losartan (COZAAR) 100 mg, oral, Daily    multivitamin/iron/folic acid (CENTRUM COMPLETE ORAL) 1 tablet, Daily    prazosin (MINIPRESS) 1 mg, oral, 2 times daily      Vitals  BP Readings from Last 2 Encounters:   01/02/25 144/75   10/16/24 140/80     BMI Readings from Last 1 Encounters:   01/02/25 24.46 kg/m²      Labs  A1C  Lab Results   Component Value Date    HGBA1C 7.0 (H) 04/16/2024    HGBA1C 6.4 (A) 08/16/2022    HGBA1C 6.3 (A) 10/11/2021     BMP  Lab Results   Component Value Date    CALCIUM 9.4 04/16/2024     04/16/2024    K 4.0 04/16/2024    CO2 24 04/16/2024     04/16/2024    BUN 15 04/16/2024    CREATININE 1.02 04/16/2024    EGFR 55 (L)  04/16/2024     LFTs  Lab Results   Component Value Date    ALT 13 04/16/2024    AST 20 04/16/2024    ALKPHOS 67 04/16/2024    BILITOT 0.8 04/16/2024     FLP  Lab Results   Component Value Date    TRIG 83 04/16/2024    CHOL 156 04/16/2024    LDLF 101 (H) 07/25/2020    LDLCALC 76 04/16/2024    HDL 63.2 04/16/2024     Urine Microalbumin  Lab Results   Component Value Date    MICROALBCREA 70.9 (H) 04/16/2024     Weight Management  Wt Readings from Last 3 Encounters:   01/02/25 66.7 kg (147 lb)   10/16/24 70.1 kg (154 lb 9.6 oz)   10/01/24 69.9 kg (154 lb)      There is no height or weight on file to calculate BMI.     Assessment/Plan   Problem List Items Addressed This Visit       Chronic obstructive pulmonary disease (Multi)     Patient with COPD that is stable and needs further observation. Based on CAT score, exacerbation history, and eosinophil count, patient is GOLD Group B. Patient is on Trelegy and Spiriva is still on the medication list. Confirmed with patient that she is not using Spiriva and emphasized to stay off of it while on Trelegy due to duplication in therapy. She reports she is still coughing and isn't sure whether it has improved. Will apply for  PAP for Trelegy when patient sends her financial documents.    Medication Changes:  CONTINUE  Trelegy Ellipta 200-62.5-25 mcg 1 puff daily          Clinical Pharmacist follow-up: ~1 year for  PAP renewal or sooner as needed, Telehealth visit    Continue all meds under the continuation of care with the referring provider and clinical pharmacy team.    Thank you,  Linda Moss, PharmD  Clinical Pharmacist  [phone]    Verbal consent to manage patient's drug therapy was obtained from the patient. They were informed they may decline to participate or withdraw from participation in pharmacy services at any time.

## 2025-03-13 ENCOUNTER — APPOINTMENT (OUTPATIENT)
Dept: OPHTHALMOLOGY | Facility: CLINIC | Age: 85
End: 2025-03-13
Payer: MEDICARE

## 2025-03-13 DIAGNOSIS — H40.1131 PRIMARY OPEN-ANGLE GLAUCOMA, BILATERAL, MILD STAGE: ICD-10-CM

## 2025-03-13 DIAGNOSIS — Z96.1 PSEUDOPHAKIA OF BOTH EYES: Primary | ICD-10-CM

## 2025-03-13 PROCEDURE — 99213 OFFICE O/P EST LOW 20 MIN: CPT | Performed by: OPHTHALMOLOGY

## 2025-03-13 PROCEDURE — G2211 COMPLEX E/M VISIT ADD ON: HCPCS | Performed by: OPHTHALMOLOGY

## 2025-03-13 PROCEDURE — 92020 GONIOSCOPY: CPT | Performed by: OPHTHALMOLOGY

## 2025-03-13 RX ORDER — DORZOLAMIDE HCL 20 MG/ML
1 SOLUTION/ DROPS OPHTHALMIC 2 TIMES DAILY
Qty: 30 ML | Refills: 3 | Status: SHIPPED | OUTPATIENT
Start: 2025-03-13 | End: 2026-03-13

## 2025-03-13 RX ORDER — LATANOPROST 50 UG/ML
1 SOLUTION/ DROPS OPHTHALMIC NIGHTLY
Qty: 7.5 ML | Refills: 3 | Status: SHIPPED | OUTPATIENT
Start: 2025-03-13

## 2025-03-13 ASSESSMENT — CUP TO DISC RATIO
OS_RATIO: 0.75
OD_RATIO: 0.6

## 2025-03-13 ASSESSMENT — VISUAL ACUITY
CORRECTION_TYPE: GLASSES
METHOD: SNELLEN - LINEAR
OD_CC: 20/25
OS_CC: 20/25

## 2025-03-13 ASSESSMENT — ENCOUNTER SYMPTOMS
HEMATOLOGIC/LYMPHATIC NEGATIVE: 0
ALLERGIC/IMMUNOLOGIC NEGATIVE: 0
NEUROLOGICAL NEGATIVE: 0
PSYCHIATRIC NEGATIVE: 0
CARDIOVASCULAR NEGATIVE: 0
EYES NEGATIVE: 0
GASTROINTESTINAL NEGATIVE: 0
ENDOCRINE NEGATIVE: 0
MUSCULOSKELETAL NEGATIVE: 0
RESPIRATORY NEGATIVE: 0
CONSTITUTIONAL NEGATIVE: 0

## 2025-03-13 ASSESSMENT — CONF VISUAL FIELD
OS_NORMAL: 1
OS_SUPERIOR_NASAL_RESTRICTION: 0
METHOD: COUNTING FINGERS
OD_INFERIOR_NASAL_RESTRICTION: 0
OD_INFERIOR_TEMPORAL_RESTRICTION: 0
OS_INFERIOR_TEMPORAL_RESTRICTION: 0
OD_SUPERIOR_NASAL_RESTRICTION: 0
OS_INFERIOR_NASAL_RESTRICTION: 0
OS_SUPERIOR_TEMPORAL_RESTRICTION: 0
OD_NORMAL: 1
OD_SUPERIOR_TEMPORAL_RESTRICTION: 0

## 2025-03-13 ASSESSMENT — GONIOSCOPY
OS_INFERIOR: D45R 2+ PTM
OS_TEMPORAL: D45R 2+ PTM
OD_INFERIOR: D45R 2+ PTM
OS_NASAL: D45R 2+ PTM
OD_SUPERIOR: D45R 2+ PTM
OD_TEMPORAL: D45R 2+ PTM
OD_NASAL: D45R 2+ PTM
OS_SUPERIOR: D45R 2+ PTM

## 2025-03-13 ASSESSMENT — PACHYMETRY
OD_CT(UM): 666
OS_CT(UM): 662

## 2025-03-13 ASSESSMENT — TONOMETRY
OD_IOP_MMHG: 13
OS_IOP_MMHG: 14
IOP_METHOD: GOLDMANN APPLANATION

## 2025-03-13 ASSESSMENT — SLIT LAMP EXAM - LIDS: COMMENTS: NORMAL

## 2025-03-13 ASSESSMENT — EXTERNAL EXAM - LEFT EYE: OS_EXAM: NORMAL

## 2025-03-13 NOTE — PROGRESS NOTES
Visual Acuity (Snellen - Linear)         Right Left    Dist cc 20/25 20/25    Dist ph cc NI NI      Correction: Glasses          Tonometry       Tonometry (Goldmann Applanation, 9:58 AM)         Right Left    Pressure 13 14                  Assessment/Plan   Last dilated:  1/9/25  specular microscopy OD:  2,698 (3/2023)  Last gonio 3/13/25 OU:  D45r 2+ PTM with nasal goniotomies    1.  Primary Open-Angle Glaucoma OU:  /662 Tm 18/19.  IOPs should be in the low teens given progression at upper teens.   s/p combined with KDB OD 2/14/23:  pre-op VA 20/60, IOP 18 mmHg.  Toric @ approx 95 degrees and target 97.  s/p combined with KDB OS 5/10/23:  pre-op VA 20/50, IOP 15 mmHg.  HVF OD hints at progression, but RNFL OD is stable.  IOPs were outside of range.  Timolol -> no effect.  Brimonidine -> 2 mmHg OD, but nothing OS.  Goal for OD is low teens.  IOP borderline today and stable.  Will continue to monitor closely, but will not advance to trabeculectomy.      Pt states that she has been using latanoprost in AM and dorzolamide QD.  Reviewed proper drop use and written instructions provided.  Increased compliance may improve IOP.     Plan:  cont latanoprost OU QHS               cont dorzolamide 0.2% OU BID               f/u 2 months to recheck IOP and maintained trend    2.  Pseudophakia (PCIOL - toric) OU:  no visually significant PCO     Plan:  monitor    3.  RLL Nevus:  pt would like removal     Plan:  as per Dr. Gomez

## 2025-03-17 ENCOUNTER — OFFICE VISIT (OUTPATIENT)
Dept: CARDIOLOGY | Facility: HOSPITAL | Age: 85
End: 2025-03-17
Payer: MEDICARE

## 2025-03-17 VITALS
BODY MASS INDEX: 25.99 KG/M2 | SYSTOLIC BLOOD PRESSURE: 154 MMHG | HEIGHT: 65 IN | OXYGEN SATURATION: 97 % | HEART RATE: 68 BPM | DIASTOLIC BLOOD PRESSURE: 82 MMHG | WEIGHT: 156 LBS

## 2025-03-17 DIAGNOSIS — I47.19 ATRIAL TACHYCARDIA (CMS-HCC): ICD-10-CM

## 2025-03-17 DIAGNOSIS — I50.22 HEART FAILURE WITH MID-RANGE EJECTION FRACTION (HFMEF): ICD-10-CM

## 2025-03-17 DIAGNOSIS — I10 PRIMARY HYPERTENSION: Primary | ICD-10-CM

## 2025-03-17 PROCEDURE — 93005 ELECTROCARDIOGRAM TRACING: CPT | Performed by: INTERNAL MEDICINE

## 2025-03-17 PROCEDURE — 99214 OFFICE O/P EST MOD 30 MIN: CPT | Performed by: INTERNAL MEDICINE

## 2025-03-17 PROCEDURE — G2211 COMPLEX E/M VISIT ADD ON: HCPCS | Performed by: INTERNAL MEDICINE

## 2025-03-17 PROCEDURE — 1159F MED LIST DOCD IN RCRD: CPT | Performed by: INTERNAL MEDICINE

## 2025-03-17 PROCEDURE — 3077F SYST BP >= 140 MM HG: CPT | Performed by: INTERNAL MEDICINE

## 2025-03-17 PROCEDURE — 3079F DIAST BP 80-89 MM HG: CPT | Performed by: INTERNAL MEDICINE

## 2025-03-17 NOTE — PROGRESS NOTES
"Chief Complaint:   No chief complaint on file.     History Of Present Illness:    Dora Palmer is a 84 y.o. female here for followup. She was hospitalized late 5/2021 with septic shock and bacteremia in the setting E coli UTI c/b NGHIA with subsequent discovery of underlying type II DM (a1c 6.5%), acute heart failure with cardiomyopathy (HFrEF; EF 45%) in setting of sepsis, and new persistent atrial fibrillation. She eventually converted to SR with no documented AF since. She was hospitalized again in October for reported acute HF. It was unclear if she was taking her medications appropriately. She had no noted atrial fibrillation during that encounter. She otherwise has a history of PAD, and COPD.      She reports doing well overall outside of some general life stressors. Reports BP's in the 140's to 150's systolic with home checks.      Last Recorded Vitals:  Vitals:    03/17/25 1356   BP: 154/82   BP Location: Left arm   Patient Position: Sitting   BP Cuff Size: Adult   Pulse: 68   SpO2: 97%   Weight: 70.8 kg (156 lb)   Height: 1.651 m (5' 5\")                 Allergies:  Patient has no known allergies.    Outpatient Medications:  Current Outpatient Medications   Medication Instructions    albuterol (ProAir HFA) 90 mcg/actuation inhaler 2 puffs, inhalation, Every 4 hours PRN    amLODIPine (NORVASC) 10 mg, oral, Daily    aspirin 81 mg chewable tablet 1 tablet, Daily    atorvastatin (LIPITOR) 40 mg, oral, Daily    carvedilol (COREG) 25 mg, oral, 2 times daily (morning and late afternoon)    cholecalciferol (Vitamin D-3) 50 MCG (2000 UT) tablet 1 tablet, Daily    dorzolamide (Trusopt) 2 % ophthalmic solution 1 drop, Both Eyes, 2 times daily    esomeprazole (NexIUM) 40 mg DR capsule 1 capsule, Daily    fluticasone-umeclidin-vilanter (Trelegy Ellipta) 200-62.5-25 mcg blister with device 1 puff, inhalation, Daily before breakfast    furosemide (LASIX) 40 mg, oral, Daily    latanoprost (Xalatan) 0.005 % ophthalmic " solution 1 drop, Both Eyes, Nightly    losartan (COZAAR) 100 mg, oral, Daily    multivitamin/iron/folic acid (CENTRUM COMPLETE ORAL) 1 tablet, Daily    prazosin (MINIPRESS) 1 mg, oral, 2 times daily         Physical Exam:  Gen Well appearing elderly female sitting up in NAD. Body mass index is 25.96 kg/m².   CV reg rate. No m/r/g appreciated. No JVD or leg edema.    Pulm Lungs clear with normal respiratory effort.  Neuro Alert and conversant. Grossly nonfocal.       I reviewed the patient's ECG - NSR with PVC's. First degree AV block.     I reviewed most recent imaging / labs / and office notes    Assessment/Plan   1. Hypertension  BP uncontrolled today. She will increase her Prazosin to 2 mg Bid. Will titrate further as needed.    2. HFmrEF  History of. EF may have improved but she remains euvolemic and asymptomatic thus no plans for a recheck at this time and will con't her losartan and furosemide indefinitely.       3. Atrial fibrillation  History of. May have been entirely secondary to her sepsis. No documented recurrence while hospitalized or with by event monitoring. We elected to not continue anticoagulation after a detailed discussion (see prior notes). Monitoring for recurrences.     4. Tobacco abuse  Smoking cessation encouraged.     5. Ectopic / atrial tachycardia  Paroxysmal. Asymptomatic when present. Monitoring for recurrence.         Follow-up 6-8 weeks        Jarod Alexander MD

## 2025-03-18 LAB
ATRIAL RATE: 68 BPM
P AXIS: 79 DEGREES
P OFFSET: 189 MS
P ONSET: 125 MS
PR INTERVAL: 200 MS
Q ONSET: 225 MS
QRS COUNT: 11 BEATS
QRS DURATION: 80 MS
QT INTERVAL: 406 MS
QTC CALCULATION(BAZETT): 431 MS
QTC FREDERICIA: 423 MS
R AXIS: 79 DEGREES
T AXIS: 76 DEGREES
T OFFSET: 428 MS
VENTRICULAR RATE: 68 BPM

## 2025-03-25 ENCOUNTER — OFFICE VISIT (OUTPATIENT)
Dept: VASCULAR SURGERY | Facility: HOSPITAL | Age: 85
End: 2025-03-25
Payer: MEDICARE

## 2025-03-25 VITALS
RESPIRATION RATE: 18 BRPM | OXYGEN SATURATION: 97 % | HEART RATE: 68 BPM | DIASTOLIC BLOOD PRESSURE: 75 MMHG | WEIGHT: 153.5 LBS | HEIGHT: 65 IN | BODY MASS INDEX: 25.58 KG/M2 | SYSTOLIC BLOOD PRESSURE: 122 MMHG

## 2025-03-25 DIAGNOSIS — Z13.6 ENCOUNTER FOR SCREENING FOR ABDOMINAL AORTIC ANEURYSM (AAA) IN PATIENT WITH GENETIC PREDISPOSITION TO AAA: Primary | ICD-10-CM

## 2025-03-25 DIAGNOSIS — Z15.89 ENCOUNTER FOR SCREENING FOR ABDOMINAL AORTIC ANEURYSM (AAA) IN PATIENT WITH GENETIC PREDISPOSITION TO AAA: Primary | ICD-10-CM

## 2025-03-25 DIAGNOSIS — I71.43 INFRARENAL ABDOMINAL AORTIC ANEURYSM (AAA) WITHOUT RUPTURE: ICD-10-CM

## 2025-03-25 DIAGNOSIS — I73.9 PVD (PERIPHERAL VASCULAR DISEASE) (CMS-HCC): ICD-10-CM

## 2025-03-25 PROCEDURE — 99213 OFFICE O/P EST LOW 20 MIN: CPT | Performed by: STUDENT IN AN ORGANIZED HEALTH CARE EDUCATION/TRAINING PROGRAM

## 2025-03-25 ASSESSMENT — ENCOUNTER SYMPTOMS
DEPRESSION: 0
LOSS OF SENSATION IN FEET: 0
OCCASIONAL FEELINGS OF UNSTEADINESS: 0

## 2025-03-25 ASSESSMENT — COLUMBIA-SUICIDE SEVERITY RATING SCALE - C-SSRS
6. HAVE YOU EVER DONE ANYTHING, STARTED TO DO ANYTHING, OR PREPARED TO DO ANYTHING TO END YOUR LIFE?: NO
2. HAVE YOU ACTUALLY HAD ANY THOUGHTS OF KILLING YOURSELF?: NO
1. IN THE PAST MONTH, HAVE YOU WISHED YOU WERE DEAD OR WISHED YOU COULD GO TO SLEEP AND NOT WAKE UP?: NO

## 2025-03-25 NOTE — PROGRESS NOTES
Primary Care Physician: Jeremy Huynh MD  Referring Provider: Tigist Saleh, APRN-CNP, DNP  [address]  [address]  [place] [address]  Date of Visit: 03/25/2025  1:00 PM EDT  Location of visit: [facility]     Chief Complaint:   No chief complaint on file.       HPI / Summary:   Dora Palmer is a 84 y.o. female presents for follow up regarding RLE claudication. Pt was last seen by Tigist Saleh NP in Sept 2024. She states since that time she has been able to walk farther without pain. Describes pain more as fatigue or tightness to her R calf. She has no wounds. She smokes about 2 cigarettes per day which is much less than previous.  No rest pain or wounds.     Her last MILAN was done 7/2024 and was 0.55 on R and 0.69 on L.     PMH: DM, HTN, HFrEF, AF, COPD  PSH: tonsillectomy, wrist surgery  Soc: smokes 2 cigarettes/day, no etoh or drug use  Fam: mother and grandmother with heart disease    Past Vascular Testing:     Allergies:   Patient has no known allergies.    Outpatient Medications:  Current Outpatient Medications   Medication Sig Dispense Refill    albuterol (ProAir HFA) 90 mcg/actuation inhaler Inhale 2 puffs every 4 hours if needed for wheezing or shortness of breath. 8.5 g 11    amLODIPine (Norvasc) 10 mg tablet TAKE 1 TABLET BY MOUTH ONCE  DAILY 100 tablet 2    aspirin 81 mg chewable tablet Chew 1 tablet (81 mg) once daily.      atorvastatin (Lipitor) 40 mg tablet Take 1 tablet (40 mg) by mouth once daily. 100 tablet 3    carvedilol (Coreg) 25 mg tablet Take 1 tablet (25 mg) by mouth 2 times daily (morning and late afternoon). 180 tablet 3    cholecalciferol (Vitamin D-3) 50 MCG (2000 UT) tablet Take 1 tablet (2,000 Units) by mouth once daily.      dorzolamide (Trusopt) 2 % ophthalmic solution Administer 1 drop into both eyes 2 times a day. 30 mL 3    esomeprazole (NexIUM) 40 mg DR capsule Take 1 capsule (40 mg) by mouth once daily.      furosemide (Lasix) 40 mg tablet  "Take 1 tablet (40 mg) by mouth once daily. 100 tablet 1    latanoprost (Xalatan) 0.005 % ophthalmic solution Administer 1 drop into both eyes once daily at bedtime. 7.5 mL 3    losartan (Cozaar) 100 mg tablet Take 1 tablet (100 mg) by mouth once daily. 100 tablet 0    multivitamin/iron/folic acid (CENTRUM COMPLETE ORAL) Take 1 tablet by mouth once daily.      prazosin (Minipress) 1 mg capsule TAKE 1 CAPSULE BY MOUTH TWICE  DAILY 200 capsule 2    fluticasone-umeclidin-vilanter (Trelegy Ellipta) 200-62.5-25 mcg blister with device Inhale 1 puff once daily in the morning. Take before meals. (Patient not taking: Reported on 3/25/2025) 1 each 11     No current facility-administered medications for this visit.     Review of Systems:  Review of Systems     Physical Exam:  Blood pressure 122/75, pulse 68, resp. rate 18, height 1.651 m (5' 5\"), weight 69.6 kg (153 lb 8 oz), SpO2 97%.  Wt Readings from Last 1 Encounters:   03/25/25 69.6 kg (153 lb 8 oz)     Physical Exam  Neuro: alert and oriented x3  Resp: comfortable on room air  CV: well perfused  Abd: soft ntnd  Pulse exam:   Palp femoral pulses BL  Doppler pedal signals BL    Last Labs:  CMP:    Chemistry    Lab Results   Component Value Date/Time     04/16/2024 1534    K 4.0 04/16/2024 1534     04/16/2024 1534    CO2 24 04/16/2024 1534    BUN 15 04/16/2024 1534    CREATININE 1.02 04/16/2024 1534    Lab Results   Component Value Date/Time    CALCIUM 9.4 04/16/2024 1534    ALKPHOS 67 04/16/2024 1534    AST 20 04/16/2024 1534    ALT 13 04/16/2024 1534    BILITOT 0.8 04/16/2024 1534          CBC:  Lab Results   Component Value Date    WBC 3.4 (L) 02/28/2023    HGB 14.4 02/28/2023    HCT 43.8 02/28/2023    MCV 96 02/28/2023     02/28/2023     HEME/ENDO:  Recent Labs     04/16/24  1534 10/11/21  1407 07/13/21  1156 06/04/21  0556   FERRITIN  --   --   --  412*   IRONSAT  --   --   --  39   TSH  --   --  8.55*  --    HGBA1C 7.0*   < >  --   --     < > = " values in this interval not displayed.      CARDIAC: No data recorded  Lab Results   Component Value Date    LDLCALC 76 04/16/2024       Notable Studies:         Assessment/Plan   84F with PMH DM, HTN, HFrEF, AF, COPD, tobacco use who presents for evaluation of RLE claudication.     #RLE claudication  - cont asa, statin  - continue daily walking, pt thinks she can rehab on her own as she has already improved her pain free walking distance  - RTC 1 yr    Orders:  No orders of the defined types were placed in this encounter.             ____________________________________________________________  Kaitlyn M Dunphy, MD  [facility]  [facility]

## 2025-04-03 ENCOUNTER — OFFICE VISIT (OUTPATIENT)
Dept: PULMONOLOGY | Facility: CLINIC | Age: 85
End: 2025-04-03
Payer: MEDICARE

## 2025-04-03 VITALS
OXYGEN SATURATION: 93 % | WEIGHT: 155 LBS | SYSTOLIC BLOOD PRESSURE: 121 MMHG | BODY MASS INDEX: 25.83 KG/M2 | DIASTOLIC BLOOD PRESSURE: 68 MMHG | HEART RATE: 72 BPM | HEIGHT: 65 IN

## 2025-04-03 DIAGNOSIS — R05.3 CHRONIC COUGH: Primary | ICD-10-CM

## 2025-04-03 DIAGNOSIS — J44.9 CHRONIC OBSTRUCTIVE PULMONARY DISEASE, UNSPECIFIED COPD TYPE (MULTI): ICD-10-CM

## 2025-04-03 DIAGNOSIS — R06.83 SNORING: ICD-10-CM

## 2025-04-03 PROCEDURE — 3078F DIAST BP <80 MM HG: CPT | Performed by: STUDENT IN AN ORGANIZED HEALTH CARE EDUCATION/TRAINING PROGRAM

## 2025-04-03 PROCEDURE — 3074F SYST BP LT 130 MM HG: CPT | Performed by: STUDENT IN AN ORGANIZED HEALTH CARE EDUCATION/TRAINING PROGRAM

## 2025-04-03 PROCEDURE — 1126F AMNT PAIN NOTED NONE PRSNT: CPT | Performed by: STUDENT IN AN ORGANIZED HEALTH CARE EDUCATION/TRAINING PROGRAM

## 2025-04-03 PROCEDURE — 99213 OFFICE O/P EST LOW 20 MIN: CPT | Performed by: STUDENT IN AN ORGANIZED HEALTH CARE EDUCATION/TRAINING PROGRAM

## 2025-04-03 PROCEDURE — 1159F MED LIST DOCD IN RCRD: CPT | Performed by: STUDENT IN AN ORGANIZED HEALTH CARE EDUCATION/TRAINING PROGRAM

## 2025-04-03 RX ORDER — TIOTROPIUM BROMIDE 18 UG/1
1 CAPSULE ORAL; RESPIRATORY (INHALATION)
Qty: 30 CAPSULE | Refills: 11 | Status: SHIPPED | OUTPATIENT
Start: 2025-04-03 | End: 2026-04-03

## 2025-04-03 ASSESSMENT — ENCOUNTER SYMPTOMS
UNEXPECTED WEIGHT CHANGE: 0
PALPITATIONS: 0
CHILLS: 0
ARTHRALGIAS: 0
ABDOMINAL DISTENTION: 0
FEVER: 0
ABDOMINAL PAIN: 0

## 2025-04-03 ASSESSMENT — PAIN SCALES - GENERAL: PAINLEVEL_OUTOF10: 0-NO PAIN

## 2025-04-03 NOTE — PATIENT INSTRUCTIONS
Thank you for visiting the Pulmonary Clinic today.   Your breathing medications: go back to spiriva --[facility]   Tests: in lab sleep study, chest xray   Referrals: ENT   Return in 4-5 months   If you have questions or concerns, call [phone] (option 4)

## 2025-04-03 NOTE — PROGRESS NOTES
Department of Medicine I Division of Pulmonary, Critical Care, and Sleep Medicine   [id number] [place] [address] [address] [place] [address]  Phone: [phone]  Fax: [phone]    History of Present Illness   Dora Palmer is a 84 y.o. female here for pulmonary follow up    4/3/2025      Was only able to do trelegy for one month due to cost--did not make much difference  Was previously on spiriva  Does note mucous in her throat and wheezing worse at nighttime   Smoking still 1-2 cigarettes a day   1/2/2025   Still having coughing and rattling   Bringing up clear mucous   Did have a mechanical fall in November --did not hurt herself   No bronchitis episodes since last time  Was unable to set up the home sleep study when it was sent to her so she sent it back, I had ordered an in lab sleep study which she hasn't completed yet     CAT: 31   MMRC: 1   Pulmonary medications: albuterol, spiriva    8/2024   In the last year has noticed increased wheezing when she lays down at night as opposed to the day time   Triggers for dyspnea:  walking up steps, biking, cleaning  Denies recurrent bronchitis   Also notes nasal congestion at night --uses flonase   Denies any seasonal allergies   Occasional cough--dry   Does snore at night   Has receive POLK for her thyroid in the past     CAT: 18   Pulmonary medications: spiriva (as needed),  albuterol as needed   Review of Systems  Review of Systems   Constitutional:  Negative for chills, fever and unexpected weight change.   Respiratory:          As per HPI    Cardiovascular:  Negative for chest pain, palpitations and leg swelling.   Gastrointestinal:  Negative for abdominal distention and abdominal pain.   Musculoskeletal:  Negative for arthralgias and gait problem.   Skin:  Negative for rash.     All other review of systems are negative and/or non-contributory.    Past Medical History   She has a past medical history of Glaucoma, Personal history of other diseases of the  circulatory system, Personal history of other diseases of the nervous system and sense organs, Personal history of other endocrine, nutritional and metabolic disease, Pseudophakia, both eyes, Radiculopathy, lumbar region (09/13/2016), Supraventricular tachycardia, and Unsteadiness on feet (07/08/2020).    Immunizations     Immunization History   Administered Date(s) Administered   • Influenza, seasonal, injectable 03/03/2023   • Moderna SARS-CoV-2 Vaccination 12/31/2021   • Pfizer Purple Cap SARS-CoV-2 03/06/2021   • Pneumococcal Conjugate PCV 7 03/03/2023   • Pneumococcal conjugate vaccine, 13-valent (PREVNAR 13) 12/15/2014   • Pneumococcal polysaccharide vaccine, 23-valent, age 2 years and older (PNEUMOVAX 23) 11/10/2009   • Tdap vaccine, age 7 year and older (BOOSTRIX, ADACEL) 11/10/2009, 12/14/2023       Medications and Allergies     Current Outpatient Medications   Medication Instructions   • albuterol (ProAir HFA) 90 mcg/actuation inhaler 2 puffs, inhalation, Every 4 hours PRN   • amLODIPine (NORVASC) 10 mg, oral, Daily   • aspirin 81 mg chewable tablet 1 tablet, Daily   • atorvastatin (LIPITOR) 40 mg, oral, Daily   • carvedilol (COREG) 25 mg, oral, 2 times daily (morning and late afternoon)   • cholecalciferol (Vitamin D-3) 50 MCG (2000 UT) tablet 1 tablet, Daily   • dorzolamide (Trusopt) 2 % ophthalmic solution 1 drop, Both Eyes, 2 times daily   • esomeprazole (NexIUM) 40 mg DR capsule 1 capsule, Daily   • fluticasone-umeclidin-vilanter (Trelegy Ellipta) 200-62.5-25 mcg blister with device 1 puff, inhalation, Daily before breakfast   • furosemide (LASIX) 40 mg, oral, Daily   • latanoprost (Xalatan) 0.005 % ophthalmic solution 1 drop, Both Eyes, Nightly   • losartan (COZAAR) 100 mg, oral, Daily   • multivitamin/iron/folic acid (CENTRUM COMPLETE ORAL) 1 tablet, Daily   • prazosin (MINIPRESS) 1 mg, oral, 2 times daily      Patient has no known allergies.    Social History   She reports that she has been smoking  cigarettes. She has never used smokeless tobacco. She reports current alcohol use. She reports that she does not use drugs.    Smoking History: still smoking,  1-2 cigarettes a day.  Smoking for about 60 years.   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     Family History     Family History   Problem Relation Name Age of Onset   • Diabetes Mother     • Hypertension Mother     • Diabetes Son             Surgical History   She has a past surgical history that includes Other surgical history (2013); Colonoscopy (2013); Tonsillectomy (2014); and Cataract extraction (Bilateral).    Physical Exam     Vitals:    25 1455   BP: 121/68   Pulse: 72   SpO2: 93%           Physical Exam  Vitals reviewed.   Constitutional:       General: She is awake.   Cardiovascular:      Rate and Rhythm: Normal rate and regular rhythm.   Pulmonary:      Effort: Pulmonary effort is normal.      Breath sounds: Rhonchi present.   Abdominal:      General: Bowel sounds are normal.      Palpations: Abdomen is soft.      Tenderness: There is no abdominal tenderness.   Neurological:      Mental Status: She is alert and oriented to person, place, and time.   Psychiatric:         Attention and Perception: Attention and perception normal.         Behavior: Behavior normal.        Results   Pulmonary Function Tests:  8/15/2024   PFT:  FEV1/FVC:  67   post FEV1: 77  + BD response,   T DLCO: 56%     Chest Radiograph:  XR chest 2 views     Narrative  Interpreted By:  RAY DAMON MD  MRN: [id number]  Patient Name: ELVIS WHITE    STUDY:   CHEST 2 VIEW PA AND LAT;  3/31/2023 11:29 am    INDICATION:  COUGH , WHEEZING.    COMPARISON:  2023    ACCESSION NUMBER(S):  [id number]    ORDERING CLINICIAN:  MARAH TELLEZ    FINDINGS:  There is no focal lung consolidation or effusion. There is no edema.  The cardiac silhouette is within  "normal limits for size.    Impression  No acute cardiopulmonary process.      Chest CT Scan:  No results found for this or any previous visit from the past 365 days.       ECHO:  No results found for this or any previous visit from the past 365 days.       Labs:  Lab Results   Component Value Date    WBC 3.4 (L) 02/28/2023    HGB 14.4 02/28/2023    HCT 43.8 02/28/2023    MCV 96 02/28/2023     02/28/2023       Lab Results   Component Value Date    CREATININE 1.02 04/16/2024    BUN 15 04/16/2024     04/16/2024    K 4.0 04/16/2024     04/16/2024    CO2 24 04/16/2024        Lab Results   Component Value Date    SEDRATE 16 07/20/2021        IgE: No results found for: \"IGE\"   Respiratory Allergy Panel:  AEC:   Eosinophils Absolute (x10E9/L)   Date Value   10/12/2021 0.20   06/05/2021 0.00     Eosinophils % (%)   Date Value   10/12/2021 4.5   06/05/2021 0.0       Cultures:  No results found for: \"AFBCX\"   No results found for: \"RESPCULTCYFI\"    No results found for the last 90 days.  C     Assessment and Plan       COPD stage II  class B , also with BD response  Snoring--high risk MUNIRA       Recommendations:   Start Trelegy 200   Can use albtuterol bid in the interim to help with pulmonary clearance   Was hesitant to pursue sleep testing again--advised to see how her symptoms do with the addition of the new inhaler--if most of her symptoms are improved then we can hold off on the in lab study, if not then would recommend to pursue it       Risa Quispe MD  04/03/2025    "

## 2025-04-09 DIAGNOSIS — I10 PRIMARY HYPERTENSION: ICD-10-CM

## 2025-04-10 RX ORDER — LOSARTAN POTASSIUM 100 MG/1
100 TABLET ORAL DAILY
Qty: 100 TABLET | Refills: 0 | Status: SHIPPED | OUTPATIENT
Start: 2025-04-10

## 2025-04-14 DIAGNOSIS — I73.9 PVD (PERIPHERAL VASCULAR DISEASE) (CMS-HCC): ICD-10-CM

## 2025-04-14 DIAGNOSIS — E78.5 HYPERLIPIDEMIA, UNSPECIFIED HYPERLIPIDEMIA TYPE: ICD-10-CM

## 2025-04-15 RX ORDER — ATORVASTATIN CALCIUM 40 MG/1
40 TABLET, FILM COATED ORAL DAILY
Qty: 100 TABLET | Refills: 0 | Status: SHIPPED | OUTPATIENT
Start: 2025-04-15

## 2025-04-20 ASSESSMENT — ENCOUNTER SYMPTOMS
LOSS OF SENSATION IN FEET: 0
DEPRESSION: 0
CONSTITUTIONAL NEGATIVE: 1
OCCASIONAL FEELINGS OF UNSTEADINESS: 0

## 2025-04-20 ASSESSMENT — ACTIVITIES OF DAILY LIVING (ADL)
TAKING_MEDICATION: INDEPENDENT
DOING_HOUSEWORK: INDEPENDENT
GROCERY_SHOPPING: INDEPENDENT
MANAGING_FINANCES: INDEPENDENT
BATHING: INDEPENDENT
DRESSING: INDEPENDENT

## 2025-04-20 NOTE — PROGRESS NOTES
"Patient ID: Dora Palmer is a 84 y.o. female who presents for Medicare Annual Wellness Visit Subsequent and Follow-up.    /64 (BP Location: Left arm, Patient Position: Sitting, BP Cuff Size: Adult)   Pulse 73   Ht 1.651 m (5' 5\")   Wt 71.5 kg (157 lb 9.6 oz)   SpO2 96%   BMI 26.23 kg/m²     HPI      Hypertension medication controlled  No chest pain, no shortness of breath, no PND, no orthopnea,  No leg swelling, no palpitation, no headache,      Asthma/COPD  He is using albuterol inhaler  He is using Spiriva  Unable to use Trelegy inhaler because of the cost    She has been having some chronic cough  I suppose this is from her COPD  Nephrology ordered a chest x-ray she is osito get 1      She has peripheral vascular disease  She is still smoking  Her claudication is better      Chf stable   No shortness of breath  , No PND  , No orthopnea  No leg swelling      Subjective     Review of Systems   Constitutional: Negative.    All other systems reviewed and are negative.      Objective     Physical Exam  Vitals and nursing note reviewed.   Constitutional:       Appearance: Normal appearance.   Neck:      Vascular: No carotid bruit.   Cardiovascular:      Rate and Rhythm: Normal rate and regular rhythm.      Pulses: Normal pulses.      Heart sounds: Normal heart sounds. No murmur heard.  Pulmonary:      Effort: Pulmonary effort is normal.      Breath sounds: Normal breath sounds.   Abdominal:      Palpations: There is no mass.   Musculoskeletal:      Right lower leg: No edema.      Left lower leg: No edema.   Skin:     Capillary Refill: Capillary refill takes less than 2 seconds.   Neurological:      General: No focal deficit present.      Mental Status: She is oriented to person, place, and time. Mental status is at baseline.   Psychiatric:         Mood and Affect: Mood normal.         Behavior: Behavior normal.         Thought Content: Thought content normal.         Judgment: Judgment normal.         Lab " Results   Component Value Date    WBC 3.4 (L) 02/28/2023    HGB 14.4 02/28/2023    HCT 43.8 02/28/2023    MCV 96 02/28/2023     02/28/2023           Problem List Items Addressed This Visit       Chronic obstructive pulmonary disease (Multi)    Diabetes (Multi)    Heart failure with mid-range ejection fraction (HFmEF)    Hypothyroidism    Hypertension    Peripheral vascular disease (CMS-HCC)    Chronic kidney disease (CKD), stage III (moderate) (Multi)     Other Visit Diagnoses         Medicare annual wellness visit, subsequent  (Chronic)   -  Primary      Encounter for counseling for care management of patient with chronic conditions and complex health needs using nurse-based model        Relevant Orders    Referral to Clinical Pharmacy                A/P         CMP, lipid, vitamin D, TSH  DEXA scan  Referral clinical pharmacy  Continue the same  Discussed with the patient the effect of chronic smoking on COPD which she already has, risk of stroke, risk of peripheral vascular disease, risk of cancer/malignancy  Offered to use nicotine patch patient deferred  Offered to use nicotine gum or lozenges patient deferred        Advance Care Planning Note     Discussion Date: 04/21/25   Discussion Participants: patient    The patient wishes to discuss Advance Care Planning today and the following is a brief summary of our discussion.     Patient has capacity to make their own medical decisions: Yes  Health Care Agent/Surrogate Decision Maker documented in chart: Yes    Documents on file and valid:  Advance Directive/Living Will: No   Health Care Power of : Yes  Other:     Communication of Medical Status/Prognosis:        Communication of Treatment Goals/Options:     Discussed with the patient end-of-life choices patient is presently full code she does not have a living will or healthcare power of  she will think about getting it done when ready will bring it on next office visit so that it can be  scanned scanned into the chart for the purpose of documentation    Treatment Decisions  Goals of Care: survival is prioritized, if goals for quality or survival can reasonably be achieved       Follow Up Plan      Team Members      Time Statement: Total face to face time spent on advance care planning was 5 minutes with 5 minutes spent in counseling, including the explanation.    Jeremy Huynh MD  4/21/2025 2:36 PM

## 2025-04-21 ENCOUNTER — HOSPITAL ENCOUNTER (OUTPATIENT)
Dept: RADIOLOGY | Facility: CLINIC | Age: 85
Discharge: HOME | End: 2025-04-21
Payer: MEDICARE

## 2025-04-21 ENCOUNTER — APPOINTMENT (OUTPATIENT)
Dept: PRIMARY CARE | Facility: CLINIC | Age: 85
End: 2025-04-21

## 2025-04-21 VITALS
HEART RATE: 73 BPM | HEIGHT: 65 IN | BODY MASS INDEX: 26.26 KG/M2 | OXYGEN SATURATION: 96 % | SYSTOLIC BLOOD PRESSURE: 132 MMHG | WEIGHT: 157.6 LBS | DIASTOLIC BLOOD PRESSURE: 64 MMHG

## 2025-04-21 DIAGNOSIS — E03.9 HYPOTHYROIDISM, UNSPECIFIED TYPE: ICD-10-CM

## 2025-04-21 DIAGNOSIS — D50.9 IRON DEFICIENCY ANEMIA, UNSPECIFIED IRON DEFICIENCY ANEMIA TYPE: ICD-10-CM

## 2025-04-21 DIAGNOSIS — R05.3 CHRONIC COUGH: ICD-10-CM

## 2025-04-21 DIAGNOSIS — Z00.00 MEDICARE ANNUAL WELLNESS VISIT, SUBSEQUENT: Primary | Chronic | ICD-10-CM

## 2025-04-21 DIAGNOSIS — Z13.220 SCREENING FOR HYPERLIPIDEMIA: ICD-10-CM

## 2025-04-21 DIAGNOSIS — Q07.8 ANOMALOUS OPTIC NERVE: ICD-10-CM

## 2025-04-21 DIAGNOSIS — N18.31 STAGE 3A CHRONIC KIDNEY DISEASE (MULTI): ICD-10-CM

## 2025-04-21 DIAGNOSIS — I73.9 PERIPHERAL VASCULAR DISEASE (CMS-HCC): ICD-10-CM

## 2025-04-21 DIAGNOSIS — E11.51 TYPE 2 DIABETES MELLITUS WITH DIABETIC PERIPHERAL ANGIOPATHY WITHOUT GANGRENE, WITHOUT LONG-TERM CURRENT USE OF INSULIN (MULTI): ICD-10-CM

## 2025-04-21 DIAGNOSIS — Z71.89 ENCOUNTER FOR COUNSELING FOR CARE MANAGEMENT OF PATIENT WITH CHRONIC CONDITIONS AND COMPLEX HEALTH NEEDS USING NURSE-BASED MODEL: ICD-10-CM

## 2025-04-21 DIAGNOSIS — Z72.0 TOBACCO USE: ICD-10-CM

## 2025-04-21 DIAGNOSIS — J44.9 CHRONIC OBSTRUCTIVE PULMONARY DISEASE, UNSPECIFIED COPD TYPE (MULTI): ICD-10-CM

## 2025-04-21 DIAGNOSIS — I50.22 HEART FAILURE WITH MID-RANGE EJECTION FRACTION (HFMEF): ICD-10-CM

## 2025-04-21 DIAGNOSIS — E55.9 VITAMIN D DEFICIENCY: ICD-10-CM

## 2025-04-21 DIAGNOSIS — Z78.0 ASYMPTOMATIC MENOPAUSE: ICD-10-CM

## 2025-04-21 DIAGNOSIS — Z13.29 SCREENING FOR THYROID DISORDER: ICD-10-CM

## 2025-04-21 DIAGNOSIS — I10 PRIMARY HYPERTENSION: ICD-10-CM

## 2025-04-21 PROBLEM — I48.0 PAROXYSMAL ATRIAL FIBRILLATION (MULTI): Status: RESOLVED | Noted: 2025-04-21 | Resolved: 2025-04-21

## 2025-04-21 PROBLEM — I48.20 CHRONIC ATRIAL FIBRILLATION, UNSPECIFIED (MULTI): Status: RESOLVED | Noted: 2025-04-21 | Resolved: 2025-04-21

## 2025-04-21 PROBLEM — I48.20 CHRONIC ATRIAL FIBRILLATION, UNSPECIFIED (MULTI): Status: ACTIVE | Noted: 2025-04-21

## 2025-04-21 PROBLEM — I48.0 PAROXYSMAL ATRIAL FIBRILLATION (MULTI): Status: ACTIVE | Noted: 2025-04-21

## 2025-04-21 PROCEDURE — 71046 X-RAY EXAM CHEST 2 VIEWS: CPT | Performed by: RADIOLOGY

## 2025-04-21 PROCEDURE — 1170F FXNL STATUS ASSESSED: CPT | Performed by: INTERNAL MEDICINE

## 2025-04-21 PROCEDURE — 99214 OFFICE O/P EST MOD 30 MIN: CPT | Performed by: INTERNAL MEDICINE

## 2025-04-21 PROCEDURE — 71046 X-RAY EXAM CHEST 2 VIEWS: CPT

## 2025-04-21 PROCEDURE — 3078F DIAST BP <80 MM HG: CPT | Performed by: INTERNAL MEDICINE

## 2025-04-21 PROCEDURE — 3075F SYST BP GE 130 - 139MM HG: CPT | Performed by: INTERNAL MEDICINE

## 2025-04-21 PROCEDURE — 1159F MED LIST DOCD IN RCRD: CPT | Performed by: INTERNAL MEDICINE

## 2025-04-21 PROCEDURE — 1160F RVW MEDS BY RX/DR IN RCRD: CPT | Performed by: INTERNAL MEDICINE

## 2025-04-21 PROCEDURE — 1158F ADVNC CARE PLAN TLK DOCD: CPT | Performed by: INTERNAL MEDICINE

## 2025-04-21 PROCEDURE — 1123F ACP DISCUSS/DSCN MKR DOCD: CPT | Performed by: INTERNAL MEDICINE

## 2025-04-21 PROCEDURE — G0439 PPPS, SUBSEQ VISIT: HCPCS | Performed by: INTERNAL MEDICINE

## 2025-04-21 ASSESSMENT — ACTIVITIES OF DAILY LIVING (ADL)
TAKING_MEDICATION: INDEPENDENT
GROCERY_SHOPPING: INDEPENDENT
DOING_HOUSEWORK: INDEPENDENT
DRESSING: INDEPENDENT
MANAGING_FINANCES: INDEPENDENT
BATHING: INDEPENDENT

## 2025-04-22 LAB
25(OH)D3+25(OH)D2 SERPL-MCNC: 53 NG/ML (ref 30–100)
ALBUMIN SERPL-MCNC: 3.8 G/DL (ref 3.6–5.1)
ALP SERPL-CCNC: 69 U/L (ref 37–153)
ALT SERPL-CCNC: 13 U/L (ref 6–29)
ANION GAP SERPL CALCULATED.4IONS-SCNC: 8 MMOL/L (CALC) (ref 7–17)
AST SERPL-CCNC: 18 U/L (ref 10–35)
BASOPHILS # BLD AUTO: 22 CELLS/UL (ref 0–200)
BASOPHILS NFR BLD AUTO: 0.4 %
BILIRUB SERPL-MCNC: 0.6 MG/DL (ref 0.2–1.2)
BUN SERPL-MCNC: 22 MG/DL (ref 7–25)
CALCIUM SERPL-MCNC: 9 MG/DL (ref 8.6–10.4)
CHLORIDE SERPL-SCNC: 107 MMOL/L (ref 98–110)
CHOLEST SERPL-MCNC: 165 MG/DL
CHOLEST/HDLC SERPL: 2.8 (CALC)
CO2 SERPL-SCNC: 25 MMOL/L (ref 20–32)
CREAT SERPL-MCNC: 1.08 MG/DL (ref 0.6–0.95)
EGFRCR SERPLBLD CKD-EPI 2021: 51 ML/MIN/1.73M2
EOSINOPHIL # BLD AUTO: 209 CELLS/UL (ref 15–500)
EOSINOPHIL NFR BLD AUTO: 3.8 %
ERYTHROCYTE [DISTWIDTH] IN BLOOD BY AUTOMATED COUNT: 13.3 % (ref 11–15)
GLUCOSE SERPL-MCNC: 126 MG/DL (ref 65–139)
HCT VFR BLD AUTO: 39 % (ref 35–45)
HDLC SERPL-MCNC: 58 MG/DL
HGB BLD-MCNC: 13 G/DL (ref 11.7–15.5)
LDLC SERPL CALC-MCNC: 76 MG/DL (CALC)
LYMPHOCYTES # BLD AUTO: 2019 CELLS/UL (ref 850–3900)
LYMPHOCYTES NFR BLD AUTO: 36.7 %
MCH RBC QN AUTO: 32 PG (ref 27–33)
MCHC RBC AUTO-ENTMCNC: 33.3 G/DL (ref 32–36)
MCV RBC AUTO: 96.1 FL (ref 80–100)
MONOCYTES # BLD AUTO: 517 CELLS/UL (ref 200–950)
MONOCYTES NFR BLD AUTO: 9.4 %
NEUTROPHILS # BLD AUTO: 2734 CELLS/UL (ref 1500–7800)
NEUTROPHILS NFR BLD AUTO: 49.7 %
NONHDLC SERPL-MCNC: 107 MG/DL (CALC)
PLATELET # BLD AUTO: 261 THOUSAND/UL (ref 140–400)
PMV BLD REES-ECKER: 10.6 FL (ref 7.5–12.5)
POTASSIUM SERPL-SCNC: 3.7 MMOL/L (ref 3.5–5.3)
PROT SERPL-MCNC: 6.5 G/DL (ref 6.1–8.1)
RBC # BLD AUTO: 4.06 MILLION/UL (ref 3.8–5.1)
SODIUM SERPL-SCNC: 140 MMOL/L (ref 135–146)
T4 FREE SERPL-MCNC: 1 NG/DL (ref 0.8–1.8)
TRIGL SERPL-MCNC: 212 MG/DL
TSH SERPL-ACNC: 8.02 MIU/L (ref 0.4–4.5)
WBC # BLD AUTO: 5.5 THOUSAND/UL (ref 3.8–10.8)

## 2025-04-23 ENCOUNTER — HOSPITAL ENCOUNTER (OUTPATIENT)
Dept: RADIOLOGY | Facility: CLINIC | Age: 85
Discharge: HOME | End: 2025-04-23
Payer: MEDICARE

## 2025-04-23 DIAGNOSIS — Z78.0 ASYMPTOMATIC MENOPAUSE: ICD-10-CM

## 2025-04-23 PROCEDURE — 77080 DXA BONE DENSITY AXIAL: CPT | Performed by: RADIOLOGY

## 2025-04-23 PROCEDURE — 77080 DXA BONE DENSITY AXIAL: CPT

## 2025-04-28 NOTE — PROGRESS NOTES
Clinical Pharmacy Appointment    Patient ID: Dora Palmer is a 84 y.o. female who presents for COPD.    Pt is here for First appointment.     Referring Provider: Jeremy Huynh MD  PCP: Jeremy Huynh MD   Last visit with PCP: 4/21/25   Next visit with PCP: 10/21/25    Subjective   HPI  COPD  Patient has been diagnosed with: Emphysema  Does patient see pulmonology: Yes    Date: 4/1/25  has had PFT's completed in last 2 years    Current Regimen  Spiriva 18 mcg Handihaler 1 puff daily    Clarifications to above regimen: None   Adverse Effects: None   Appropriate technique? Yes    Historical Treatment  Trelegy    Symptom Management  Current symptoms: cough and wheezing  Triggers: N/A  Alleviating factors: N/A    Exacerbation Hx  When was your last hospitalization for an exacerbation? None  When was the last time you were treated with antibiotics and/or steroids? >1 year ago     Rescue Inhaler Use  How often do you use your rescue inhaler? Has not used in a few months    Immunization History:  Influenza: Date: 3/3/2023  PCV20: 3/3/23  COVID: 2021  RSV: None    Smoking History  She currently smokes 1-2 cigarettes per day.    Drug Interactions  No relevant drug interactions were noted.    Medication System Management  Patient's preferred pharmacy: [facility]  Adherence/Organization: Deliveries can be late from [facility] so she will go a few days without meds occasionally.   Affordability/Accessibility: [place] Had been assessed for PAP 2 months ago and patient thought she filed taxes. She definitely did not for 2024 and will send her SS benefits.     Patient Assistance Program (PAP)    Application for program to be submitted for the following medications: [facility] Permanent Address: [facility]   Prescription Insurance:  Yes   Members of Household: 1   Files Taxes: No     Patient will be faxing financial information to pharmacist directly at [phone].    Patient verbally  reports monthly or yearly income which is less than 400% federal poverty level    Patient aware this process may take up to 6 weeks.     If approved medication must be filled through [facility] and MEDICATION WILL BE MAILED TO PATIENT.    Objective   No Known Allergies  Social History     Social History Narrative    Not on file      Medication Review  Current Outpatient Medications   Medication Instructions    albuterol (ProAir HFA) 90 mcg/actuation inhaler 2 puffs, inhalation, Every 4 hours PRN    amLODIPine (NORVASC) 10 mg, oral, Daily    aspirin 81 mg chewable tablet 1 tablet, Daily    atorvastatin (LIPITOR) 40 mg, oral, Daily    carvedilol (COREG) 25 mg, oral, 2 times daily (morning and late afternoon)    cholecalciferol (Vitamin D-3) 50 MCG (2000 UT) tablet 1 tablet, Daily    dorzolamide (Trusopt) 2 % ophthalmic solution 1 drop, Both Eyes, 2 times daily    esomeprazole (NexIUM) 40 mg DR capsule 1 capsule, Daily    furosemide (LASIX) 40 mg, oral, Daily    latanoprost (Xalatan) 0.005 % ophthalmic solution 1 drop, Both Eyes, Nightly    losartan (COZAAR) 100 mg, oral, Daily    multivitamin/iron/folic acid (CENTRUM COMPLETE ORAL) 1 tablet, Daily    prazosin (MINIPRESS) 1 mg, oral, 2 times daily    tiotropium (SPIRIVA WITH HANDIHALER) 18 mcg, inhalation, Daily RT      Vitals  BP Readings from Last 2 Encounters:   04/21/25 132/64   04/03/25 121/68     BMI Readings from Last 1 Encounters:   04/21/25 26.23 kg/m²      Labs  A1C  Lab Results   Component Value Date    HGBA1C 7.0 (H) 04/16/2024    HGBA1C 6.4 (A) 08/16/2022    HGBA1C 6.3 (A) 10/11/2021     BMP  Lab Results   Component Value Date    CALCIUM 9.0 04/21/2025     04/21/2025    K 3.7 04/21/2025    CO2 25 04/21/2025     04/21/2025    BUN 22 04/21/2025    CREATININE 1.08 (H) 04/21/2025    EGFR 51 (L) 04/21/2025     LFTs  Lab Results   Component Value Date    ALT 13 04/21/2025    AST 18 04/21/2025    ALKPHOS 69 04/21/2025    BILITOT 0.6  04/21/2025     FLP  Lab Results   Component Value Date    TRIG 212 (H) 04/21/2025    CHOL 165 04/21/2025    LDLF 101 (H) 07/25/2020    LDLCALC 76 04/21/2025    HDL 58 04/21/2025     Urine Microalbumin  Lab Results   Component Value Date    MICROALBCREA 70.9 (H) 04/16/2024     Weight Management  Wt Readings from Last 3 Encounters:   04/21/25 71.5 kg (157 lb 9.6 oz)   04/03/25 70.3 kg (155 lb)   03/25/25 69.6 kg (153 lb 8 oz)      There is no height or weight on file to calculate BMI.     Assessment/Plan   Problem List Items Addressed This Visit       Chronic obstructive pulmonary disease (Multi) - Primary    Patient with COPD that needs further observation. Based on CAT score, exacerbation history, and eosinophil count, patient is GOLD Group B. She had tried Trelegy for a month and could not afford it. Was unsure if it was better than Spiriva. In the interim she has gone back on Spiriva, but patient reports PCP and pulm think Trelegy would help better. Reviewed  PAP requirements again and will apply for  PAP for Trelegy when patient sends her financial documents. Patient currently coughs often and occasionally wakes up at night wheezing.    Medication Changes:  CONTINUE  Spiriva 18 mcg Handihaler 1 puff daily for now.         Relevant Orders    Referral to Clinical Pharmacy     Other Visit Diagnoses         Encounter for counseling for care management of patient with chronic conditions and complex health needs using nurse-based model              Clinical Pharmacist follow-up: 7/29/25, Telehealth visit    Continue all meds under the continuation of care with the referring provider and clinical pharmacy team.    Thank you,  Linda Moss, PharmD  Clinical Pharmacist  [phone]    Verbal consent to manage patient's drug therapy was obtained from the patient. They were informed they may decline to participate or withdraw from participation in pharmacy services at any time.   Biopsy Method: Double edge Personna blades

## 2025-04-29 ENCOUNTER — APPOINTMENT (OUTPATIENT)
Dept: PHARMACY | Facility: HOSPITAL | Age: 85
End: 2025-04-29
Payer: MEDICARE

## 2025-04-29 DIAGNOSIS — Z71.89 ENCOUNTER FOR COUNSELING FOR CARE MANAGEMENT OF PATIENT WITH CHRONIC CONDITIONS AND COMPLEX HEALTH NEEDS USING NURSE-BASED MODEL: ICD-10-CM

## 2025-04-29 DIAGNOSIS — J43.9 PULMONARY EMPHYSEMA, UNSPECIFIED EMPHYSEMA TYPE (MULTI): Primary | ICD-10-CM

## 2025-04-30 NOTE — ASSESSMENT & PLAN NOTE
Patient with COPD that needs further observation. Based on CAT score, exacerbation history, and eosinophil count, patient is GOLD Group B. She had tried Trelegy for a month and could not afford it. Was unsure if it was better than Spiriva. In the interim she has gone back on Spiriva, but patient reports PCP and pulm think Trelegy would help better. Reviewed  PAP requirements again and will apply for  PAP for Trelegy when patient sends her financial documents. Patient currently coughs often and occasionally wakes up at night wheezing.    Medication Changes:  CONTINUE  Spiriva 18 mcg Handihaler 1 puff daily for now.

## 2025-05-08 PROCEDURE — RXMED WILLOW AMBULATORY MEDICATION CHARGE

## 2025-05-09 ENCOUNTER — PHARMACY VISIT (OUTPATIENT)
Dept: PHARMACY | Facility: CLINIC | Age: 85
End: 2025-05-09
Payer: COMMERCIAL

## 2025-05-19 ENCOUNTER — APPOINTMENT (OUTPATIENT)
Dept: CARDIOLOGY | Facility: HOSPITAL | Age: 85
End: 2025-05-19
Payer: MEDICARE

## 2025-05-19 VITALS
SYSTOLIC BLOOD PRESSURE: 125 MMHG | BODY MASS INDEX: 25.94 KG/M2 | HEIGHT: 65 IN | HEART RATE: 63 BPM | OXYGEN SATURATION: 98 % | DIASTOLIC BLOOD PRESSURE: 71 MMHG | WEIGHT: 155.7 LBS

## 2025-05-19 DIAGNOSIS — Z72.0 TOBACCO USE: ICD-10-CM

## 2025-05-19 DIAGNOSIS — I10 PRIMARY HYPERTENSION: Primary | ICD-10-CM

## 2025-05-19 DIAGNOSIS — I47.19 ATRIAL TACHYCARDIA: ICD-10-CM

## 2025-05-19 DIAGNOSIS — I50.22 HEART FAILURE WITH MID-RANGE EJECTION FRACTION (HFMEF): ICD-10-CM

## 2025-05-19 PROCEDURE — 99214 OFFICE O/P EST MOD 30 MIN: CPT | Performed by: INTERNAL MEDICINE

## 2025-05-19 PROCEDURE — G2211 COMPLEX E/M VISIT ADD ON: HCPCS | Performed by: INTERNAL MEDICINE

## 2025-05-19 PROCEDURE — 99212 OFFICE O/P EST SF 10 MIN: CPT | Performed by: INTERNAL MEDICINE

## 2025-05-19 PROCEDURE — 1159F MED LIST DOCD IN RCRD: CPT | Performed by: INTERNAL MEDICINE

## 2025-05-19 PROCEDURE — 3074F SYST BP LT 130 MM HG: CPT | Performed by: INTERNAL MEDICINE

## 2025-05-19 PROCEDURE — 3078F DIAST BP <80 MM HG: CPT | Performed by: INTERNAL MEDICINE

## 2025-05-19 NOTE — PROGRESS NOTES
"Chief Complaint:   No chief complaint on file.     History Of Present Illness:    Dora Palmer is a 84 y.o. female here for followup. She was hospitalized late 5/2021 with septic shock and bacteremia in the setting E coli UTI c/b N[facility]IA with subsequent discovery of underlying type II DM (a1c 6.5%), acute heart failure with cardiomyopathy (HFrEF; EF 45%) in setting of sepsis, and new persistent atrial fibrillation. She eventually converted to SR with no documented AF since. She was hospitalized again in October for reported acute HF. It was unclear if she was taking her medications appropriately. She had no noted atrial fibrillation during that encounter. She otherwise has a history of PAD, and COPD.      She reports doing well apart from some transient lightheadedness this morning. Has been compliant with her medications. Seems to be taking only 1 mg BID of Prazosin rather than 2 mg BID as previously discussed though her BP's have seemingly improved.    Last Recorded Vitals:  Vitals:    05/19/25 1424   BP: 125/71   Pulse: 63   SpO2: 98%   Weight: 70.6 kg (155 lb 11.2 oz)   Height: 1.651 m (5' 5\")                 Allergies:  Patient has no known allergies.    Outpatient Medications:  Current Outpatient Medications   Medication Instructions    albuterol (ProAir HFA) 90 mcg/actuation inhaler 2 puffs, inhalation, Every 4 hours PRN    amLODIPine (NORVASC) 10 mg, oral, Daily    aspirin 81 mg chewable tablet 1 tablet, Daily    atorvastatin (LIPITOR) 40 mg, oral, Daily    carvedilol (COREG) 25 mg, oral, 2 times daily (morning and late afternoon)    cholecalciferol (Vitamin D-3) 50 MCG (2000 UT) tablet 1 tablet, Daily    dorzolamide (Trusopt) 2 % ophthalmic solution 1 drop, Both Eyes, 2 times daily    esomeprazole (NexIUM) 40 mg DR capsule 1 capsule, Daily    fluticasone-umeclidin-vilanter (Trelegy Ellipta) 100-62.5-25 mcg blister with device 1 puff, inhalation, Daily    furosemide (LASIX) 40 mg, oral, Daily    latanoprost " (Xalatan) 0.005 % ophthalmic solution 1 drop, Both Eyes, Nightly    losartan (COZAAR) 100 mg, oral, Daily    multivitamin/iron/folic acid (CENTRUM COMPLETE ORAL) 1 tablet, Daily    prazosin (MINIPRESS) 1 mg, oral, 2 times daily         Physical Exam:  Gen Well appearing elderly female sitting up in NAD. Body mass index is 25.91 kg/m².   CV reg rate. No m/r/g appreciated. No JVD or leg edema.    Pulm Lungs clear with normal respiratory effort.  Neuro Alert and conversant. Grossly nonfocal.      I reviewed most recent imaging / labs / and office notes      Assessment/Plan   1. Hypertension  BP well controlled. Her present regimen is to continue. She is to verify her home Prazosin dose after she gets back to her house.    2. HFmrEF  History of. EF may have improved but she remains euvolemic and asymptomatic thus no plans for a recheck at this time and will con't her losartan and furosemide indefinitely.       3. Atrial fibrillation  History of. May have been entirely secondary to her sepsis. No documented recurrence while hospitalized or with by event monitoring. We elected to not continue anticoagulation after a detailed discussion (see prior notes). Monitoring for recurrences.     4. Tobacco abuse  Smoking cessation encouraged.     5. Ectopic / atrial tachycardia  Paroxysmal. Asymptomatic when present. Monitoring for recurrence.         Follow-up 6 months         Jarod Alexander MD

## 2025-06-05 ENCOUNTER — OFFICE VISIT (OUTPATIENT)
Dept: OBSTETRICS AND GYNECOLOGY | Facility: CLINIC | Age: 85
End: 2025-06-05
Payer: MEDICARE

## 2025-06-05 ENCOUNTER — APPOINTMENT (OUTPATIENT)
Dept: OPHTHALMOLOGY | Facility: CLINIC | Age: 85
End: 2025-06-05
Payer: MEDICARE

## 2025-06-05 VITALS
DIASTOLIC BLOOD PRESSURE: 58 MMHG | HEIGHT: 65 IN | SYSTOLIC BLOOD PRESSURE: 105 MMHG | WEIGHT: 156.4 LBS | BODY MASS INDEX: 26.06 KG/M2 | HEART RATE: 58 BPM

## 2025-06-05 DIAGNOSIS — Z96.1 PSEUDOPHAKIA OF BOTH EYES: ICD-10-CM

## 2025-06-05 DIAGNOSIS — Z46.89 PESSARY MAINTENANCE: Primary | ICD-10-CM

## 2025-06-05 DIAGNOSIS — H40.1131 PRIMARY OPEN-ANGLE GLAUCOMA, BILATERAL, MILD STAGE: Primary | ICD-10-CM

## 2025-06-05 DIAGNOSIS — M62.89 PELVIC FLOOR DYSFUNCTION: ICD-10-CM

## 2025-06-05 DIAGNOSIS — N81.4 UTEROVAGINAL PROLAPSE: Primary | ICD-10-CM

## 2025-06-05 PROCEDURE — 3074F SYST BP LT 130 MM HG: CPT | Performed by: NURSE PRACTITIONER

## 2025-06-05 PROCEDURE — 1159F MED LIST DOCD IN RCRD: CPT | Performed by: NURSE PRACTITIONER

## 2025-06-05 PROCEDURE — 3078F DIAST BP <80 MM HG: CPT | Performed by: NURSE PRACTITIONER

## 2025-06-05 PROCEDURE — 1125F AMNT PAIN NOTED PAIN PRSNT: CPT | Performed by: NURSE PRACTITIONER

## 2025-06-05 PROCEDURE — 99213 OFFICE O/P EST LOW 20 MIN: CPT | Performed by: NURSE PRACTITIONER

## 2025-06-05 PROCEDURE — 99213 OFFICE O/P EST LOW 20 MIN: CPT | Performed by: OPHTHALMOLOGY

## 2025-06-05 ASSESSMENT — TONOMETRY
OS_IOP_MMHG: 12
IOP_METHOD: GOLDMANN APPLANATION
OD_IOP_MMHG: 12

## 2025-06-05 ASSESSMENT — PACHYMETRY
OD_CT(UM): 666
OS_CT(UM): 662

## 2025-06-05 ASSESSMENT — VISUAL ACUITY
OS_CC+: -2
OS_CC: 20/20
OD_CC: 20/20
METHOD: SNELLEN - LINEAR
OD_CC+: -1

## 2025-06-05 ASSESSMENT — EXTERNAL EXAM - LEFT EYE: OS_EXAM: NORMAL

## 2025-06-05 ASSESSMENT — SLIT LAMP EXAM - LIDS: COMMENTS: NORMAL

## 2025-06-05 ASSESSMENT — CUP TO DISC RATIO
OD_RATIO: 0.6
OS_RATIO: 0.75

## 2025-06-05 ASSESSMENT — PAIN SCALES - GENERAL: PAINLEVEL_OUTOF10: 2

## 2025-06-05 NOTE — PROGRESS NOTES
Visual Acuity (Snellen - Linear)         Right Left    Dist cc 20/20 -1 20/20 -2          Tonometry       Tonometry (Goldmann Applanation, 8:28 AM)         Right Left    Pressure 12 12                  Assessment/Plan   Last dilated:  1/9/25  specular microscopy OD:  2,698 (3/2023)  Last gonio 3/13/25 OU:  D45r 2+ PTM with nasal goniotomies    1.  Primary Open-Angle Glaucoma OU:  /662 Tm 18/19.  IOPs should be in the low teens given progression at upper teens.   s/p combined with KDB OD 2/14/23:  pre-op VA 20/60, IOP 18 mmHg.  Toric @ approx 95 degrees and target 97.  s/p combined with KDB OS 5/10/23:  pre-op VA 20/50, IOP 15 mmHg.  HVF OD hints at progression, but RNFL OD is stable.  IOPs were outside of range.  Timolol -> no effect.  Brimonidine -> 2 mmHg OD, but nothing OS.  Goal for OD is low teens.  IOP borderline today and stable.  Will continue to monitor closely, but will not advance to trabeculectomy.      Increased compliance has led to return of IOP to controlled level.     Plan:  cont latanoprost OU QHS               cont dorzolamide 0.2% OU BID               f/u 4 months to recheck IOP and maintained trend    2.  Pseudophakia (PCIOL - toric) OU:  no visually significant PCO     Plan:  monitor    3.  RLL Nevus:  pt would like removal     Plan:  as per Dr. Gomez

## 2025-06-05 NOTE — PROGRESS NOTES
"Urogynecology Pessary Check Visit  Lazara Andre, APRN-CNP  [phone]      History of Present Illness:    HPI    Ms. Dora Palmer  presents for pessary check.    Last visit: 7/23/24   Pessary type:  #3 ring with support    Bleeding?: No   Discomfort?: No   She endorses vaginal discharge.   The patient has not removed the pessary since the last visit almost a year ago.     The patient reports slight discomfort and tenderness on the left side of the abdomen. No history of diverticulitis. The patient is eating well and does not report significant bloating or pain.     Past medical, surgical, social, family, allergy, and medication histories were reviewed and updated in EPIC charting.       Review of Systems      Objective    /58   Pulse 58   Ht 1.651 m (5' 5\")   Wt 70.9 kg (156 lb 6.4 oz)   BMI 26.03 kg/m²    Body mass index is 26.03 kg/m².     Physical Exam:  Physical Exam  Constitutional:       General: She is not in acute distress.     Appearance: Normal appearance. She is normal weight. She is not ill-appearing.   Abdominal:      Comments: L sided obturator internus, levator, and iliococcygeus are tender.    Neurological:      Mental Status: She is alert.   Psychiatric:         Mood and Affect: Mood normal.         Behavior: Behavior normal.         Thought Content: Thought content normal.         Judgment: Judgment normal.             Pelvic exam:   Speculum examination: The vagina and cervix were inspected and were free of erosions. No blood in vaginal vault. Normal discharge appreciated.    Bimanual exam: The uterus was normal.  There were no masses or tenderness in the pelvis/adnexal region.   The pessary was removed and left out.   On exam, she has a slightly red and irritated vagina. No bleeding.         Plan:    Dora Palmer is a 84 y.o. female who presents for pessary management.     Pessary management, uterine prolapse   - Patient had muscle tenderness on exam and elected to leave " her pessary out for a while to monitor if the pain improves.   - We also discussed possible referral to PFPT in the future if needed.   - She can RTC sooner than 1 month if she is bothered by the prolapse.      Follow-up in 1 month for pessary replacement with JACQUE Butts.     Scribe Attestation:   I, Kathie Latif, am scribing for virtually, and in the presence of JACQUE Butts on 06/05/2025 at 7:36 PM.     SPEKE audio duration: 15 minutes    I spent a total of {eConsult Time:38511} in face to face and non face to face time.     JACQUE Butts

## 2025-06-12 DIAGNOSIS — E03.9 HYPOTHYROIDISM, UNSPECIFIED TYPE: Primary | ICD-10-CM

## 2025-06-12 DIAGNOSIS — E78.1 HYPERTRIGLYCERIDEMIA: Primary | ICD-10-CM

## 2025-06-12 RX ORDER — LEVOTHYROXINE SODIUM 112 UG/1
112 TABLET ORAL DAILY
Qty: 90 TABLET | Refills: 1 | Status: SHIPPED | OUTPATIENT
Start: 2025-06-12 | End: 2025-09-10

## 2025-06-12 RX ORDER — ATORVASTATIN CALCIUM 80 MG/1
80 TABLET, FILM COATED ORAL DAILY
Qty: 100 TABLET | Refills: 1 | Status: SHIPPED | OUTPATIENT
Start: 2025-06-12 | End: 2025-09-10

## 2025-06-21 DIAGNOSIS — I10 PRIMARY HYPERTENSION: ICD-10-CM

## 2025-06-22 RX ORDER — LOSARTAN POTASSIUM 100 MG/1
100 TABLET ORAL DAILY
Qty: 100 TABLET | Refills: 2 | Status: SHIPPED | OUTPATIENT
Start: 2025-06-22

## 2025-06-23 ENCOUNTER — TELEPHONE (OUTPATIENT)
Dept: PRIMARY CARE | Facility: CLINIC | Age: 85
End: 2025-06-23
Payer: MEDICARE

## 2025-06-26 ENCOUNTER — OFFICE VISIT (OUTPATIENT)
Dept: PRIMARY CARE | Facility: CLINIC | Age: 85
End: 2025-06-26
Payer: MEDICARE

## 2025-06-26 VITALS
SYSTOLIC BLOOD PRESSURE: 108 MMHG | WEIGHT: 149.6 LBS | BODY MASS INDEX: 24.89 KG/M2 | HEART RATE: 70 BPM | OXYGEN SATURATION: 96 % | DIASTOLIC BLOOD PRESSURE: 63 MMHG

## 2025-06-26 DIAGNOSIS — J40 BRONCHITIS: ICD-10-CM

## 2025-06-26 DIAGNOSIS — J44.9 CHRONIC OBSTRUCTIVE PULMONARY DISEASE, UNSPECIFIED COPD TYPE (MULTI): ICD-10-CM

## 2025-06-26 DIAGNOSIS — R03.1 LOW BLOOD PRESSURE READING: Primary | Chronic | ICD-10-CM

## 2025-06-26 PROCEDURE — 3074F SYST BP LT 130 MM HG: CPT | Performed by: INTERNAL MEDICINE

## 2025-06-26 PROCEDURE — G2211 COMPLEX E/M VISIT ADD ON: HCPCS | Performed by: INTERNAL MEDICINE

## 2025-06-26 PROCEDURE — 1160F RVW MEDS BY RX/DR IN RCRD: CPT | Performed by: INTERNAL MEDICINE

## 2025-06-26 PROCEDURE — 3078F DIAST BP <80 MM HG: CPT | Performed by: INTERNAL MEDICINE

## 2025-06-26 PROCEDURE — 99214 OFFICE O/P EST MOD 30 MIN: CPT | Performed by: INTERNAL MEDICINE

## 2025-06-26 PROCEDURE — 1159F MED LIST DOCD IN RCRD: CPT | Performed by: INTERNAL MEDICINE

## 2025-06-26 RX ORDER — AMOXICILLIN 500 MG/1
500 CAPSULE ORAL EVERY 8 HOURS SCHEDULED
Qty: 30 CAPSULE | Refills: 0 | Status: SHIPPED | OUTPATIENT
Start: 2025-06-26 | End: 2025-07-06

## 2025-06-26 RX ORDER — PREDNISONE 10 MG/1
TABLET ORAL
Qty: 15 TABLET | Refills: 0 | Status: SHIPPED | OUTPATIENT
Start: 2025-06-26

## 2025-06-26 ASSESSMENT — PATIENT HEALTH QUESTIONNAIRE - PHQ9
2. FEELING DOWN, DEPRESSED OR HOPELESS: NOT AT ALL
1. LITTLE INTEREST OR PLEASURE IN DOING THINGS: NOT AT ALL
SUM OF ALL RESPONSES TO PHQ9 QUESTIONS 1 AND 2: 0
SUM OF ALL RESPONSES TO PHQ9 QUESTIONS 1 AND 2: 0
1. LITTLE INTEREST OR PLEASURE IN DOING THINGS: NOT AT ALL
2. FEELING DOWN, DEPRESSED OR HOPELESS: NOT AT ALL

## 2025-06-26 ASSESSMENT — ENCOUNTER SYMPTOMS
CONSTITUTIONAL NEGATIVE: 1
OCCASIONAL FEELINGS OF UNSTEADINESS: 0
DEPRESSION: 0
LOSS OF SENSATION IN FEET: 0

## 2025-06-26 NOTE — PROGRESS NOTES
Patient ID: Dora Palmer is a 84 y.o. female who presents for Hypotension (LOW B/P, NO ENERGY, SOB).    /63 (BP Location: Left arm, Patient Position: Sitting, BP Cuff Size: Adult)   Pulse 70   Wt 67.9 kg (149 lb 9.6 oz)   SpO2 96%   BMI 24.89 kg/m²     HPI        Patient is feeling very fatigued and tired  Occasionally feeling dizzy and lightheaded  No blood in the stool  No diarrhea, no nausea, no vomiting      Her blood pressure is running low  She had cut back prazosin 1 mg  She has cut back carvedilol 25 mg a day  No chest pain, no shortness of breath        Asthma/COPD  He is using albuterol inhaler sparingly   Unable to use Trelegy inhaler because of the cost     She has been having some chronic cough  I suppose this is from her COPD  She feels like her COPD is flared up  Or she could be having pneumonia  Though she does not have any fever  She does not have any chills      She is still smoking        She has peripheral vascular disease  She is still smoking  Her claudication is better        Chf stable   No shortness of breath  , No PND  , No orthopnea  No leg swelling                  Subjective     Review of Systems   Constitutional: Negative.    All other systems reviewed and are negative.      Objective     Physical Exam  Vitals and nursing note reviewed.   Constitutional:       Appearance: Normal appearance.   Neck:      Vascular: No carotid bruit.   Cardiovascular:      Rate and Rhythm: Normal rate and regular rhythm.      Pulses: Normal pulses.      Heart sounds: Normal heart sounds. No murmur heard.  Pulmonary:      Breath sounds: Examination of the right-upper field reveals decreased breath sounds, wheezing and rhonchi. Examination of the left-upper field reveals decreased breath sounds, wheezing and rhonchi. Examination of the right-middle field reveals decreased breath sounds, wheezing and rhonchi. Examination of the left-middle field reveals decreased breath sounds, wheezing and  rhonchi. Examination of the right-lower field reveals decreased breath sounds, wheezing and rhonchi. Examination of the left-lower field reveals decreased breath sounds, wheezing and rhonchi. Decreased breath sounds, wheezing and rhonchi present.         Lab Results   Component Value Date    WBC 5.5 04/21/2025    HGB 13.0 04/21/2025    HCT 39.0 04/21/2025    MCV 96.1 04/21/2025     04/21/2025           Problem List Items Addressed This Visit       Chronic obstructive pulmonary disease (Multi)    Relevant Medications    amoxicillin (Amoxil) 500 mg capsule    predniSONE (Deltasone) 10 mg tablet    Low blood pressure reading - Primary     Other Visit Diagnoses         Bronchitis        Relevant Medications    amoxicillin (Amoxil) 500 mg capsule    predniSONE (Deltasone) 10 mg tablet              A/P         Offered her blood test, patient reluctant  Offered chest x-ray she is reluctant  We will treat her empirically for exacerbation of COPD/bronchitis  Amoxicillin 500 mg every 8 hourly for 10 days  Prednisone 10 mg 1.5 tablets p.o. every day for 10 days  To take with food and 1 glass of water  Cut back amlodipine from 10 mg to 5 mg  Need to drink little bit of more fluid  She will let me know about her blood pressure reading  If not better she will have to go to the emergency  Or she will have to let me know

## 2025-07-08 ENCOUNTER — OFFICE VISIT (OUTPATIENT)
Dept: OBSTETRICS AND GYNECOLOGY | Facility: CLINIC | Age: 85
End: 2025-07-08
Payer: MEDICARE

## 2025-07-08 VITALS
HEIGHT: 65 IN | SYSTOLIC BLOOD PRESSURE: 149 MMHG | WEIGHT: 153 LBS | BODY MASS INDEX: 25.49 KG/M2 | DIASTOLIC BLOOD PRESSURE: 71 MMHG

## 2025-07-08 DIAGNOSIS — Z46.89 PESSARY MAINTENANCE: Primary | ICD-10-CM

## 2025-07-08 DIAGNOSIS — N39.3 SUI (STRESS URINARY INCONTINENCE, FEMALE): ICD-10-CM

## 2025-07-08 PROCEDURE — 99214 OFFICE O/P EST MOD 30 MIN: CPT | Performed by: NURSE PRACTITIONER

## 2025-07-08 PROCEDURE — 1126F AMNT PAIN NOTED NONE PRSNT: CPT | Performed by: NURSE PRACTITIONER

## 2025-07-08 PROCEDURE — 99214 OFFICE O/P EST MOD 30 MIN: CPT | Mod: 25 | Performed by: NURSE PRACTITIONER

## 2025-07-08 PROCEDURE — 3077F SYST BP >= 140 MM HG: CPT | Performed by: NURSE PRACTITIONER

## 2025-07-08 PROCEDURE — 3078F DIAST BP <80 MM HG: CPT | Performed by: NURSE PRACTITIONER

## 2025-07-08 PROCEDURE — 1159F MED LIST DOCD IN RCRD: CPT | Performed by: NURSE PRACTITIONER

## 2025-07-08 ASSESSMENT — ENCOUNTER SYMPTOMS
RESPIRATORY NEGATIVE: 1
GASTROINTESTINAL NEGATIVE: 1
HEMATOLOGIC/LYMPHATIC NEGATIVE: 1
NEUROLOGICAL NEGATIVE: 1
PSYCHIATRIC NEGATIVE: 1
EYES NEGATIVE: 1
ENDOCRINE NEGATIVE: 1
CONSTITUTIONAL NEGATIVE: 1
ALLERGIC/IMMUNOLOGIC NEGATIVE: 1
CARDIOVASCULAR NEGATIVE: 1
MUSCULOSKELETAL NEGATIVE: 1

## 2025-07-08 ASSESSMENT — PAIN SCALES - GENERAL: PAINLEVEL_OUTOF10: 0-NO PAIN

## 2025-07-08 NOTE — PROGRESS NOTES
"Urogynecology Pessary Check Visit  Lazara Andre, APRN-CNP  [phone]      History of Present Illness:    HPI    Ms. Dora Palmer presents for pessary check.     Last visit: 6/5/2025   Pessary type: #3 ring with support. Inquires about alternative POP management options. Prefers to avoid surgery due to age. Mentions she previously attended PT for other issues. Prefers to avoid OAB meds. Desires to resume pessary use.   Bleeding?: Denies   Discomfort?: Reports the prolapse has returned, with sensation of protrusion when lying on either side. Notes a decrease in discharge without pessary in place. Experienced pain with coughing. Mentions having to manually reposition the prolapse. Seeks evaluation to assess for any progression of prolapse.   Bowel or bladder symptoms: Increased urinary urgency and leakage with inability to hold bladder since the pessary was left out.   Vaginal estrogen: None     Past medical, surgical, social, family, allergy, and medication histories were reviewed and updated in EPIC charting.       Review of Systems   Constitutional: Negative.    HENT: Negative.     Eyes: Negative.    Respiratory: Negative.     Cardiovascular: Negative.    Gastrointestinal: Negative.    Endocrine: Negative.    Genitourinary: Negative.    Musculoskeletal: Negative.    Skin: Negative.    Allergic/Immunologic: Negative.    Neurological: Negative.    Hematological: Negative.    Psychiatric/Behavioral: Negative.           Objective    /71   Ht 1.651 m (5' 5\")   Wt 69.4 kg (153 lb)   BMI 25.46 kg/m²    Body mass index is 25.46 kg/m².     Physical Exam:  Physical Exam  Constitutional:       Appearance: Normal appearance.   Genitourinary:      Vulva, bladder and urethral meatus normal.      Genitourinary Comments: Prolapse remains at 0, consistent with previous assessment.      No vaginal tenderness.      Levator ani is tender.      Pelvic exam was performed with patient in the lithotomy position and " standing.   HENT:      Head: Normocephalic and atraumatic.   Neurological:      General: No focal deficit present.      Mental Status: She is alert and oriented to person, place, and time.   Psychiatric:         Mood and Affect: Mood normal.         Behavior: Behavior normal.         Thought Content: Thought content normal.         Judgment: Judgment normal.       Pelvic exam:   Speculum examination: The vagina and cervix were inspected and were free of erosions. No blood in vaginal vault. Normal discharge appreciated.    Bimanual exam: The uterus was wnl. There were no masses or tenderness in the pelvis/adnexal region.   The pessary was cleaned and replaced without immediate complications.       Assessment and Plan:  84 y.o. female with BRITTANY presents for pessary maintenance. Comorbidities include: HTN, T2DM, CKD stage 3, OA.    Diagnosis List:  #1 Pessary maintenance  #2 BRITTANY    Plan:  1. Pessary maintenance  - Pessary was cleaned and replaced without immediate complications.  - Monitor for comfort and effectiveness. Consider a smaller pessary if discomfort persists.    2. BRITTANY  - Satisfactory management with pessary with no erosions. Continue current care.    - We discussed that PFPT would help strengthen the PF muscles to help better support the urethra.   - Discussed the risks, benefits, surgical steps, success rate, and expected postoperative recovery course of the midurethral sling. The advantages of this surgery include a high success rate of around 85% effective at treating BRITTANY and the 15% of people who still have persistent BRITTANY after a sling do report improvement in their degree of BRITTANY leakage after TVT placement. The risks include around a 3% chance of mesh exposure and if this were to occur we do not have to treat this if it is asymptomatic as it is not dangerous or life threatening - if it was bothersome we would plan for starting her on E2 to help the vaginal epithelium grow over the exposed area of mesh  and if symptoms persist after trying E2 we could plan for an exposed mesh excision procedure in the OR. The recovery time for the midurethral sling is around 6 weeks of pelvic rest and no heavy lifting.       Follow up in 6 months with JACQUE Butts.      Scribe Attestation  By signing my name below, I, González Brown, attest that this documentation has been prepared under the direction and in the presence of JACQUE Butts on 07/08/2025 at 5:14 PM.     SPEKE audio duration: 14 minutes    I spent a total of *** minutes in face to face and non face to face time.       JACQUE Butts

## 2025-07-15 ENCOUNTER — TELEPHONE (OUTPATIENT)
Dept: PRIMARY CARE | Facility: CLINIC | Age: 85
End: 2025-07-15
Payer: MEDICARE

## 2025-07-15 DIAGNOSIS — R06.02 SOB (SHORTNESS OF BREATH): ICD-10-CM

## 2025-07-15 DIAGNOSIS — R42 LIGHT HEADED: Primary | ICD-10-CM

## 2025-07-15 DIAGNOSIS — R42 DIZZY: ICD-10-CM

## 2025-07-15 DIAGNOSIS — I95.89 OTHER SPECIFIED HYPOTENSION: ICD-10-CM

## 2025-07-20 DIAGNOSIS — R60.0 EDEMA OF BOTH LEGS: ICD-10-CM

## 2025-07-21 RX ORDER — FUROSEMIDE 40 MG/1
40 TABLET ORAL DAILY
Qty: 100 TABLET | Refills: 2 | Status: SHIPPED | OUTPATIENT
Start: 2025-07-21

## 2025-07-23 LAB
ALBUMIN SERPL-MCNC: 3.5 G/DL (ref 3.6–5.1)
ALP SERPL-CCNC: 64 U/L (ref 37–153)
ALT SERPL-CCNC: 14 U/L (ref 6–29)
ANION GAP SERPL CALCULATED.4IONS-SCNC: 7 MMOL/L (CALC) (ref 7–17)
AST SERPL-CCNC: 15 U/L (ref 10–35)
BASOPHILS # BLD AUTO: 21 CELLS/UL (ref 0–200)
BASOPHILS NFR BLD AUTO: 0.4 %
BILIRUB SERPL-MCNC: 0.6 MG/DL (ref 0.2–1.2)
BNP SERPL-MCNC: 257 PG/ML
BUN SERPL-MCNC: 20 MG/DL (ref 7–25)
CALCIUM SERPL-MCNC: 8.5 MG/DL (ref 8.6–10.4)
CHLORIDE SERPL-SCNC: 109 MMOL/L (ref 98–110)
CO2 SERPL-SCNC: 26 MMOL/L (ref 20–32)
CREAT SERPL-MCNC: 1.11 MG/DL (ref 0.6–0.95)
EGFRCR SERPLBLD CKD-EPI 2021: 49 ML/MIN/1.73M2
EOSINOPHIL # BLD AUTO: 281 CELLS/UL (ref 15–500)
EOSINOPHIL NFR BLD AUTO: 5.3 %
ERYTHROCYTE [DISTWIDTH] IN BLOOD BY AUTOMATED COUNT: 13.5 % (ref 11–15)
GLUCOSE SERPL-MCNC: 154 MG/DL (ref 65–99)
HCT VFR BLD AUTO: 37.8 % (ref 35–45)
HGB BLD-MCNC: 12.4 G/DL (ref 11.7–15.5)
LYMPHOCYTES # BLD AUTO: 1617 CELLS/UL (ref 850–3900)
LYMPHOCYTES NFR BLD AUTO: 30.5 %
MCH RBC QN AUTO: 32.3 PG (ref 27–33)
MCHC RBC AUTO-ENTMCNC: 32.8 G/DL (ref 32–36)
MCV RBC AUTO: 98.4 FL (ref 80–100)
MONOCYTES # BLD AUTO: 451 CELLS/UL (ref 200–950)
MONOCYTES NFR BLD AUTO: 8.5 %
NEUTROPHILS # BLD AUTO: 2931 CELLS/UL (ref 1500–7800)
NEUTROPHILS NFR BLD AUTO: 55.3 %
PLATELET # BLD AUTO: 210 THOUSAND/UL (ref 140–400)
PMV BLD REES-ECKER: 10.7 FL (ref 7.5–12.5)
POTASSIUM SERPL-SCNC: 4.1 MMOL/L (ref 3.5–5.3)
PROT SERPL-MCNC: 6.1 G/DL (ref 6.1–8.1)
RBC # BLD AUTO: 3.84 MILLION/UL (ref 3.8–5.1)
SODIUM SERPL-SCNC: 142 MMOL/L (ref 135–146)
WBC # BLD AUTO: 5.3 THOUSAND/UL (ref 3.8–10.8)

## 2025-07-29 ENCOUNTER — APPOINTMENT (OUTPATIENT)
Dept: PHARMACY | Facility: HOSPITAL | Age: 85
End: 2025-07-29
Payer: MEDICARE

## 2025-07-29 DIAGNOSIS — J43.9 PULMONARY EMPHYSEMA, UNSPECIFIED EMPHYSEMA TYPE (MULTI): Primary | ICD-10-CM

## 2025-07-29 DIAGNOSIS — R05.9 COUGH, UNSPECIFIED TYPE: ICD-10-CM

## 2025-07-29 DIAGNOSIS — J45.20 MILD INTERMITTENT ASTHMATIC BRONCHITIS WITHOUT COMPLICATION (HHS-HCC): ICD-10-CM

## 2025-07-29 RX ORDER — ALBUTEROL SULFATE 90 UG/1
2 INHALANT RESPIRATORY (INHALATION) EVERY 4 HOURS PRN
Qty: 20.1 G | Refills: 1 | Status: SHIPPED | OUTPATIENT
Start: 2025-07-29 | End: 2026-07-29

## 2025-07-29 NOTE — ASSESSMENT & PLAN NOTE
Patient with COPD that has improved and needs further observation. Just as pulmonologist and PCP predicted, patient reports improvement in respiratory symptoms with Trelegy vs Spiriva. Wheezing has resolved, although patient still has a cough, which can be disruptive at night. Will continue Trelegy and encouraged patient to try albuterol before bed to alleviate nighttime coughing. Will send new prescription of albuterol since last prescription is almost . Follow up in 3 weeks.    Medication Changes:  CONTINUE  Trelegy 100-62-25 mcg/act 1 puff daily  START  Albuterol HFA 90 mcg/act 2 puffs every 6 hours as needed

## 2025-07-29 NOTE — PROGRESS NOTES
Clinical Pharmacy Appointment    Patient ID: Dora Palmer is a 84 y.o. female who presents for COPD.    Pt is here for Follow Up appointment.     Referring Provider: Jeremy Huynh MD  PCP: Jeremy Huynh MD   Last visit with PCP: 6/26/25  Next visit with PCP: 7/30/25    Subjective   HPI  COPD  Patient has been diagnosed with: Emphysema  Does patient see pulmonology: Yes    Date: 4/1/25  has had PFT's completed in last 2 years    Current Regimen  Trelegy Ellipta 1 puff daily    Clarifications to above regimen: None   Adverse Effects: None   Appropriate technique? Yes    Historical Treatment  Trelegy  Spiriva    Symptom Management  Current symptoms: cough  Triggers: N/A  Alleviating factors: N/A    Exacerbation Hx  When was your last hospitalization for an exacerbation? None  When was the last time you were treated with antibiotics and/or steroids? June 2025     Rescue Inhaler Use  How often do you use your rescue inhaler? Used twice since last visit    Immunization History:  Influenza: Date: 3/3/2023  PCV20: 3/3/23  COVID: 2021  RSV: None    Smoking History  She currently smokes 1-2 cigarettes per day.    Drug Interactions  No relevant drug interactions were noted.    Medication System Management  Patient's preferred pharmacy: [facility]  Adherence/Organization: Deliveries can be late from [facility] so she will go a few days without meds occasionally.   Affordability/Accessibility: Trelegy covered by  PAP through 5/8/26.    Objective   No Known Allergies  Social History     Social History Narrative    Not on file      Medication Review  Current Outpatient Medications   Medication Instructions    albuterol (ProAir HFA) 90 mcg/actuation inhaler 2 puffs, inhalation, Every 4 hours PRN    amLODIPine (NORVASC) 10 mg, oral, Daily    aspirin 81 mg chewable tablet 1 tablet, Daily    atorvastatin (LIPITOR) 80 mg, oral, Daily, Please note change in dose    carvedilol (COREG) 25 mg, oral, 2  times daily (morning and late afternoon)    cholecalciferol (Vitamin D-3) 50 MCG (2000 UT) tablet 1 tablet, Daily    dorzolamide (Trusopt) 2 % ophthalmic solution 1 drop, Both Eyes, 2 times daily    esomeprazole (NexIUM) 40 mg DR capsule 1 capsule, Daily    fluticasone-umeclidin-vilanter (Trelegy Ellipta) 100-62.5-25 mcg blister with device 1 puff, inhalation, Daily    furosemide (LASIX) 40 mg, oral, Daily    latanoprost (Xalatan) 0.005 % ophthalmic solution 1 drop, Both Eyes, Nightly    levothyroxine (SYNTHROID, LEVOXYL) 112 mcg, oral, Daily, Take on an empty stomach at the same time each day, either 30 to 60 minutes prior to breakfast    losartan (COZAAR) 100 mg, oral, Daily    multivitamin/iron/folic acid (CENTRUM COMPLETE ORAL) 1 tablet, Daily    prazosin (MINIPRESS) 1 mg, oral, 2 times daily      Vitals  BP Readings from Last 2 Encounters:   07/08/25 149/71   06/26/25 108/63     BMI Readings from Last 1 Encounters:   07/08/25 25.46 kg/m²      Labs  A1C  Lab Results   Component Value Date    HGBA1C 7.0 (H) 04/16/2024    HGBA1C 6.4 (A) 08/16/2022    HGBA1C 6.3 (A) 10/11/2021     BMP  Lab Results   Component Value Date    CALCIUM 8.5 (L) 07/22/2025     07/22/2025    K 4.1 07/22/2025    CO2 26 07/22/2025     07/22/2025    BUN 20 07/22/2025    CREATININE 1.11 (H) 07/22/2025    EGFR 49 (L) 07/22/2025     LFTs  Lab Results   Component Value Date    ALT 14 07/22/2025    AST 15 07/22/2025    ALKPHOS 64 07/22/2025    BILITOT 0.6 07/22/2025     FLP  Lab Results   Component Value Date    TRIG 212 (H) 04/21/2025    CHOL 165 04/21/2025    LDLF 101 (H) 07/25/2020    LDLCALC 76 04/21/2025    HDL 58 04/21/2025     Urine Microalbumin  Lab Results   Component Value Date    MICROALBCREA 70.9 (H) 04/16/2024     Weight Management  Wt Readings from Last 3 Encounters:   07/08/25 69.4 kg (153 lb)   06/26/25 67.9 kg (149 lb 9.6 oz)   06/05/25 70.9 kg (156 lb 6.4 oz)      There is no height or weight on file to calculate BMI.      Assessment/Plan   Problem List Items Addressed This Visit       Chronic obstructive pulmonary disease (Multi) - Primary    Patient with COPD that has improved and needs further observation. Just as pulmonologist and PCP predicted, patient reports improvement in respiratory symptoms with Trelegy vs Spiriva. Wheezing has resolved, although patient still has a cough, which can be disruptive at night. Will continue Trelegy and encouraged patient to try albuterol before bed to alleviate nighttime coughing. Will send new prescription of albuterol since last prescription is almost . Follow up in 3 weeks.    Medication Changes:  CONTINUE  Trelegy 100-62-25 mcg/act 1 puff daily  START  Albuterol HFA 90 mcg/act 2 puffs every 6 hours as needed             Relevant Medications    albuterol (ProAir HFA) 90 mcg/actuation inhaler    Other Relevant Orders    Referral to Clinical Pharmacy     Other Visit Diagnoses         Cough, unspecified type        Relevant Medications    albuterol (ProAir HFA) 90 mcg/actuation inhaler      Mild intermittent asthmatic bronchitis without complication ([facility])              Clinical Pharmacist follow-up: 25, Telehealth visit    Continue all meds under the continuation of care with the referring provider and clinical pharmacy team.    Thank you,  Linda Moss, PharmD  Clinical Pharmacist  [phone]    Verbal consent to manage patient's drug therapy was obtained from the patient. They were informed they may decline to participate or withdraw from participation in pharmacy services at any time.

## 2025-07-30 ENCOUNTER — APPOINTMENT (OUTPATIENT)
Dept: PRIMARY CARE | Facility: CLINIC | Age: 85
End: 2025-07-30
Payer: MEDICARE

## 2025-07-30 VITALS
WEIGHT: 151.6 LBS | HEART RATE: 69 BPM | SYSTOLIC BLOOD PRESSURE: 122 MMHG | DIASTOLIC BLOOD PRESSURE: 72 MMHG | BODY MASS INDEX: 25.23 KG/M2 | OXYGEN SATURATION: 98 %

## 2025-07-30 DIAGNOSIS — R06.02 SOB (SHORTNESS OF BREATH) ON EXERTION: Primary | Chronic | ICD-10-CM

## 2025-07-30 DIAGNOSIS — J44.9 CHRONIC OBSTRUCTIVE PULMONARY DISEASE, UNSPECIFIED COPD TYPE (MULTI): ICD-10-CM

## 2025-07-30 DIAGNOSIS — I50.43 CHF (CONGESTIVE HEART FAILURE), NYHA CLASS I, ACUTE ON CHRONIC, COMBINED: ICD-10-CM

## 2025-07-30 DIAGNOSIS — I50.9 CONGESTIVE HEART FAILURE, UNSPECIFIED HF CHRONICITY, UNSPECIFIED HEART FAILURE TYPE: ICD-10-CM

## 2025-07-30 DIAGNOSIS — I10 PRIMARY HYPERTENSION: ICD-10-CM

## 2025-07-30 DIAGNOSIS — R26.2 DIFFICULTY WALKING: ICD-10-CM

## 2025-07-30 DIAGNOSIS — N18.32 CKD STAGE 3B, GFR 30-44 ML/MIN (MULTI): ICD-10-CM

## 2025-07-30 DIAGNOSIS — E11.69 TYPE 2 DIABETES MELLITUS WITH OTHER SPECIFIED COMPLICATION, WITHOUT LONG-TERM CURRENT USE OF INSULIN: Primary | ICD-10-CM

## 2025-07-30 PROCEDURE — 1159F MED LIST DOCD IN RCRD: CPT | Performed by: INTERNAL MEDICINE

## 2025-07-30 PROCEDURE — 3078F DIAST BP <80 MM HG: CPT | Performed by: INTERNAL MEDICINE

## 2025-07-30 PROCEDURE — 3074F SYST BP LT 130 MM HG: CPT | Performed by: INTERNAL MEDICINE

## 2025-07-30 PROCEDURE — 1160F RVW MEDS BY RX/DR IN RCRD: CPT | Performed by: INTERNAL MEDICINE

## 2025-07-30 PROCEDURE — 99215 OFFICE O/P EST HI 40 MIN: CPT | Performed by: INTERNAL MEDICINE

## 2025-07-30 RX ORDER — CARVEDILOL 25 MG/1
25 TABLET ORAL
Qty: 180 TABLET | Refills: 3 | Status: SHIPPED | OUTPATIENT
Start: 2025-07-30 | End: 2026-07-30

## 2025-07-30 ASSESSMENT — PATIENT HEALTH QUESTIONNAIRE - PHQ9
2. FEELING DOWN, DEPRESSED OR HOPELESS: NOT AT ALL
2. FEELING DOWN, DEPRESSED OR HOPELESS: NOT AT ALL
SUM OF ALL RESPONSES TO PHQ9 QUESTIONS 1 AND 2: 0
1. LITTLE INTEREST OR PLEASURE IN DOING THINGS: NOT AT ALL
1. LITTLE INTEREST OR PLEASURE IN DOING THINGS: NOT AT ALL
SUM OF ALL RESPONSES TO PHQ9 QUESTIONS 1 AND 2: 0

## 2025-07-30 NOTE — PROGRESS NOTES
Patient ID: Dora Palmer is a 84 y.o. female who presents for Hypotension (Low b/p).    /72   Pulse 69   Wt 68.8 kg (151 lb 9.6 oz)   SpO2 98%   BMI 25.23 kg/m²     HPI        Patient is occasionally feeling very fatigued and tired  Occasionally feeling dizzy and lightheaded  No blood in the stool  No diarrhea, no nausea, no vomiting  It happens more when her blood pressure   Ranges from 100-110/50-60   No palpitation         Her blood pressure is running low  She had cut back prazosin 1 mg  She has cut back carvedilol 25 mg a day  No chest pain, she occasionally gets sob   When walking for too long and at a fast pace   No leg swelling            Asthma/COPD  He is using albuterol inhaler sparingly   She is presently using TRELEGY INHALER          She has been having some chronic cough  I suppose this is from her COPD  And also post nasal drip         She is still smoking here and there         She has peripheral vascular disease  She is still smoking sparingly   Her claudication is better        Chf  seems stable   Recent   No PND  No orthopnea  No leg swelling            CKD STAGE 3B       Subjective     Review of Systems   Constitutional: Negative.    All other systems reviewed and are negative.      Objective     Physical Exam  Vitals and nursing note reviewed.   Constitutional:       Appearance: Normal appearance.   Neck:      Vascular: No carotid bruit.     Cardiovascular:      Rate and Rhythm: Normal rate and regular rhythm.      Pulses: Normal pulses.      Heart sounds: Normal heart sounds. No murmur heard.  Pulmonary:      Breath sounds: Examination of the right-upper field reveals decreased breath sounds. Examination of the left-upper field reveals decreased breath sounds. Examination of the right-middle field reveals decreased breath sounds. Examination of the left-middle field reveals decreased breath sounds. Examination of the right-lower field reveals decreased breath sounds.  Examination of the left-lower field reveals decreased breath sounds. Decreased breath sounds present.   Abdominal:      Palpations: There is no mass.     Musculoskeletal:      Right lower leg: No edema.      Left lower leg: No edema.     Skin:     Capillary Refill: Capillary refill takes 2 to 3 seconds.     Neurological:      General: No focal deficit present.      Mental Status: She is oriented to person, place, and time. Mental status is at baseline.     Psychiatric:         Mood and Affect: Mood normal.         Behavior: Behavior normal.         Thought Content: Thought content normal.         Judgment: Judgment normal.         Lab Results   Component Value Date    WBC 5.3 07/22/2025    HGB 12.4 07/22/2025    HCT 37.8 07/22/2025    MCV 98.4 07/22/2025     07/22/2025           Problem List Items Addressed This Visit       Chronic obstructive pulmonary disease (Multi)    Relevant Orders    Disability Placard    Hypertension    Relevant Medications    carvedilol (Coreg) 25 mg tablet     Other Visit Diagnoses         SOB (shortness of breath) on exertion  (Chronic)   -  Primary    Relevant Orders    Disability Placard      Congestive heart failure, unspecified HF chronicity, unspecified heart failure type        Relevant Medications    carvedilol (Coreg) 25 mg tablet    Other Relevant Orders    Comprehensive metabolic panel    Transthoracic echo (TTE) complete      CKD stage 3b, GFR 30-44 ml/min (Multi)        Relevant Medications    carvedilol (Coreg) 25 mg tablet    Other Relevant Orders    Comprehensive metabolic panel      CHF (congestive heart failure), NYHA class I, acute on chronic, combined        Relevant Medications    carvedilol (Coreg) 25 mg tablet    Other Relevant Orders    Transthoracic echo (TTE) complete      Difficulty walking        Relevant Orders    Disability Placard                  A/P         WILL GET ECHO REQUISITION GIVEN   CMP ON 08/29/2025  CARVEDILOL 25MG 1 TAB PO BID FILLED    HANDICAPPED PARKING PRESCRIPTION GIVEN   WILL SEE HER AFTER ECHO  Discussed the adverse effect of smoking on overall all-cause mortality  Discussed the adverse effect of smoking on cardiovascular and cerebrovascular health  Adverse effect of smoking on peripheral vas/COPD  Discussed the adverse effect of smoking and cancer/malignancy  Discussed the adverse effect of smoking on osteoporosis

## 2025-08-01 LAB
ALBUMIN/CREAT UR: 132 MG/G CREAT
CREAT UR-MCNC: 53 MG/DL (ref 20–275)
EST. AVERAGE GLUCOSE BLD GHB EST-MCNC: 171 MG/DL
EST. AVERAGE GLUCOSE BLD GHB EST-SCNC: 9.5 MMOL/L
HBA1C MFR BLD: 7.6 %
MICROALBUMIN UR-MCNC: 7 MG/DL

## 2025-08-03 DIAGNOSIS — N18.31 CKD STAGE 3A, GFR 45-59 ML/MIN (MULTI): Primary | ICD-10-CM

## 2025-08-03 DIAGNOSIS — E11.69 TYPE 2 DIABETES MELLITUS WITH OTHER SPECIFIED COMPLICATION, WITHOUT LONG-TERM CURRENT USE OF INSULIN: Primary | ICD-10-CM

## 2025-08-03 DIAGNOSIS — E11.69 TYPE 2 DIABETES MELLITUS WITH OTHER SPECIFIED COMPLICATION, WITHOUT LONG-TERM CURRENT USE OF INSULIN: ICD-10-CM

## 2025-08-03 RX ORDER — METFORMIN HYDROCHLORIDE 500 MG/1
500 TABLET, EXTENDED RELEASE ORAL
Qty: 100 TABLET | Refills: 3 | Status: SHIPPED | OUTPATIENT
Start: 2025-08-03 | End: 2026-09-07

## 2025-08-05 ENCOUNTER — ANCILLARY PROCEDURE (OUTPATIENT)
Dept: CARDIOLOGY | Facility: CLINIC | Age: 85
End: 2025-08-05
Payer: MEDICARE

## 2025-08-05 DIAGNOSIS — I50.43 CHF (CONGESTIVE HEART FAILURE), NYHA CLASS I, ACUTE ON CHRONIC, COMBINED: ICD-10-CM

## 2025-08-05 DIAGNOSIS — I50.9 CONGESTIVE HEART FAILURE, UNSPECIFIED HF CHRONICITY, UNSPECIFIED HEART FAILURE TYPE: ICD-10-CM

## 2025-08-05 LAB
AORTIC VALVE MEAN GRADIENT: 4 MMHG
AORTIC VALVE PEAK VELOCITY: 1.5 M/S
AV PEAK GRADIENT: 9 MMHG
AVA (PEAK VEL): 1.43 CM2
AVA (VTI): 1.38 CM2
EJECTION FRACTION APICAL 4 CHAMBER: 36.9
EJECTION FRACTION: 38 %
LEFT ATRIUM VOLUME AREA LENGTH INDEX BSA: 46.2 ML/M2
LEFT VENTRICLE INTERNAL DIMENSION DIASTOLE: 4.8 CM (ref 3.5–6)
LEFT VENTRICULAR OUTFLOW TRACT DIAMETER: 1.98 CM
MITRAL VALVE E/A RATIO: 0.56
RIGHT VENTRICLE FREE WALL PEAK S': 13 CM/S
RIGHT VENTRICLE PEAK SYSTOLIC PRESSURE: 29 MMHG
TRICUSPID ANNULAR PLANE SYSTOLIC EXCURSION: 2 CM

## 2025-08-05 PROCEDURE — 93306 TTE W/DOPPLER COMPLETE: CPT

## 2025-08-05 PROCEDURE — 93306 TTE W/DOPPLER COMPLETE: CPT | Performed by: INTERNAL MEDICINE

## 2025-08-07 ENCOUNTER — APPOINTMENT (OUTPATIENT)
Dept: OTOLARYNGOLOGY | Facility: CLINIC | Age: 85
End: 2025-08-07
Payer: MEDICARE

## 2025-08-07 VITALS — BODY MASS INDEX: 24.84 KG/M2 | HEIGHT: 65 IN | TEMPERATURE: 97 F | WEIGHT: 149.1 LBS

## 2025-08-07 DIAGNOSIS — J34.3 HYPERTROPHY OF NASAL TURBINATES: Primary | ICD-10-CM

## 2025-08-07 DIAGNOSIS — J34.89 NASAL OBSTRUCTION: ICD-10-CM

## 2025-08-07 DIAGNOSIS — H61.23 BILATERAL IMPACTED CERUMEN: ICD-10-CM

## 2025-08-07 PROCEDURE — 1160F RVW MEDS BY RX/DR IN RCRD: CPT | Performed by: NURSE PRACTITIONER

## 2025-08-07 PROCEDURE — 1159F MED LIST DOCD IN RCRD: CPT | Performed by: NURSE PRACTITIONER

## 2025-08-07 PROCEDURE — 1126F AMNT PAIN NOTED NONE PRSNT: CPT | Performed by: NURSE PRACTITIONER

## 2025-08-07 PROCEDURE — 99203 OFFICE O/P NEW LOW 30 MIN: CPT | Performed by: NURSE PRACTITIONER

## 2025-08-07 PROCEDURE — 69210 REMOVE IMPACTED EAR WAX UNI: CPT | Performed by: NURSE PRACTITIONER

## 2025-08-07 ASSESSMENT — PAIN SCALES - GENERAL: PAINLEVEL_OUTOF10: 0-NO PAIN

## 2025-08-07 ASSESSMENT — PATIENT HEALTH QUESTIONNAIRE - PHQ9
SUM OF ALL RESPONSES TO PHQ9 QUESTIONS 1 & 2: 0
1. LITTLE INTEREST OR PLEASURE IN DOING THINGS: NOT AT ALL
2. FEELING DOWN, DEPRESSED OR HOPELESS: NOT AT ALL

## 2025-08-07 NOTE — PROGRESS NOTES
Subjective   Patient ID: Dora Palmer is a 84 y.o. female who presents for nasal obstruction and chronic throat clearing.  HPI  Patient is new to me she has a medical history of hypertension, CKD, GERD, COPD and type 2 diabetes.  Her main complaint today is right nostril obstruction and both are obstructed at night.  Per patient she uses Vicks which has not been helpful. She recently had Flonase prescription but has not used it yet. No chronic sinus infection or prior nasal surgery.  Denies having chronic nasal drainage, facial pain or pressure, epiphora or periorbital edema.  Patient also reports that in the  last 6 months she has postnasal drainage and seem to have frequent throat clearing due to the drainage.  She also noticed that sometimes she chokes on liquids.  Admits to having occasional scratchy voice.  No hemoptysis, trismus, odynophagia.  She does have history of GERD and currently taking PPI.  Patient also states that although she wears bilateral hearing aids that her hearing is not good due to the wax impaction.  Denies having any ear pain or ear drainage.    Review of Systems  All systems reviewed and are negative except mentioned in HPI.    Objective   Physical Exam    CONSTITUTIONAL: No acute distress, normal facial features; No fever; no chills  VOICE: No hoarseness or other audible abnormality  RESPIRATION: Breathing comfortably, no stridor; normal breathing effort  CV: No cyanosis visible on the face and neck area  EYES:Pupils equal and round ; no erythema; conjunctiva clear; sclera white  NEURO: Alert and oriented, able to raise eyebrows symmetrical bilateral, smile with no facial droop, able to swallow  HEAD AND FACE: Symmetric facial features, no masses or lesions    Right ear examination: External ear normal.  EAC with impacted cerumen.  TM not visualized.  Left ear examination: External ear normal.  EAC with impacted cerumen.  TM not visualized.    NOSE: External nose midline; inferior  nasal turbinates hypertrophied.  Septum is midline.  No mucopus or polyps seen.  No purulent drainage seen.  ORAL CAVITY: No lesions of external lips; uvula is midline; tongue with good mobility; no gross mass in oral cavity; mucosa appears pink   NECK/LYMPH: No obvious deformity or lesions; trachea is midline  PSYCH: Alert and oriented with appropriate mood and affect.    Patient ID: Dora Palmer is a 84 y.o. female.    Procedures    Cerumen removal    Consent:  The planned procedure is discussed including possible risk, benefits and alternative treatments reviewed.  Verbal consent is obtained.    Indications:Obstructed cerumen is noted affecting hearing and causing discomfort.    Procedure: The ears are examined microscopically.  Using speculum, alligator, #7, #5 suction the obstructive cerumen in both the ears were removed.    Findings: Cerumen and epithelial debris obstruction in both external auditory canals.  Inspection of tympanic membrane after cleaning showed intact with no effusion, retraction or perforation.    Post procedure: The patient tolerated the procedure well without complications       Assessment/Plan       1. Hypertrophy of nasal turbinates        2. Nasal obstruction        3. Bilateral impacted cerumen          Her clinical exam today showed that she has bilateral nasal turbinate hypertrophy that is moderate to severe.  No mucopus or polyps seen in bilateral nasal passages.  No septal deviation seen anteriorly.  I recommend to use saline spray and clean the nose thoroughly before using the Flonase nasal spray.  Patient was given instruction in clinic and patient return demonstration on proper application of nasal spray.  Advised to use for the next 3 months daily and we will reevaluate.  If not better consider nasal endoscopy on follow-up.  Bilateral ears were cleaned today.  She had complete cerumen obstruction in bilateral ear canals.  After cleaning patient was able to hear better with  her hearing aids.  I recommend yearly cleaning for both the ears.    This note was created using speech recognition transcription software. Despite proofreading, several typographical errors might be present that might affect the meaning of the content. Please call with any questions.           JACQUE Thornton 08/07/25 8:24 AM

## 2025-08-07 NOTE — PATIENT INSTRUCTIONS
Use saline spray prior to using Flonase nasal spray.  Use your nasal sprays daily.    Follow-up in 3 months.

## 2025-08-20 ENCOUNTER — APPOINTMENT (OUTPATIENT)
Dept: PHARMACY | Facility: HOSPITAL | Age: 85
End: 2025-08-20
Payer: MEDICARE

## 2025-08-20 DIAGNOSIS — E03.9 HYPOTHYROIDISM, UNSPECIFIED TYPE: ICD-10-CM

## 2025-08-20 DIAGNOSIS — J43.9 PULMONARY EMPHYSEMA, UNSPECIFIED EMPHYSEMA TYPE (MULTI): ICD-10-CM

## 2025-08-20 DIAGNOSIS — E11.51 TYPE 2 DIABETES MELLITUS WITH DIABETIC PERIPHERAL ANGIOPATHY WITHOUT GANGRENE, WITHOUT LONG-TERM CURRENT USE OF INSULIN (MULTI): Primary | ICD-10-CM

## 2025-08-20 PROCEDURE — RXMED WILLOW AMBULATORY MEDICATION CHARGE

## 2025-08-20 RX ORDER — FLUTICASONE FUROATE, UMECLIDINIUM BROMIDE AND VILANTEROL TRIFENATATE 100; 62.5; 25 UG/1; UG/1; UG/1
1 POWDER RESPIRATORY (INHALATION) DAILY
Qty: 180 EACH | Refills: 2 | Status: SHIPPED | OUTPATIENT
Start: 2025-08-20

## 2025-08-21 RX ORDER — LEVOTHYROXINE SODIUM 112 UG/1
112 TABLET ORAL DAILY
Qty: 100 TABLET | Refills: 0 | Status: SHIPPED | OUTPATIENT
Start: 2025-08-21

## 2025-08-25 ENCOUNTER — PHARMACY VISIT (OUTPATIENT)
Dept: PHARMACY | Facility: CLINIC | Age: 85
End: 2025-08-25
Payer: COMMERCIAL

## 2025-08-27 LAB
ALBUMIN SERPL-MCNC: 3.9 G/DL (ref 3.6–5.1)
ALP SERPL-CCNC: 60 U/L (ref 37–153)
ALT SERPL-CCNC: 21 U/L (ref 6–29)
ANION GAP SERPL CALCULATED.4IONS-SCNC: 8 MMOL/L (CALC) (ref 7–17)
AST SERPL-CCNC: 24 U/L (ref 10–35)
BILIRUB SERPL-MCNC: 0.6 MG/DL (ref 0.2–1.2)
BUN SERPL-MCNC: 22 MG/DL (ref 7–25)
CALCIUM SERPL-MCNC: 9.4 MG/DL (ref 8.6–10.4)
CHLORIDE SERPL-SCNC: 104 MMOL/L (ref 98–110)
CO2 SERPL-SCNC: 26 MMOL/L (ref 20–32)
CREAT SERPL-MCNC: 1 MG/DL (ref 0.6–0.95)
EGFRCR SERPLBLD CKD-EPI 2021: 56 ML/MIN/1.73M2
EST. AVERAGE GLUCOSE BLD GHB EST-MCNC: 160 MG/DL
EST. AVERAGE GLUCOSE BLD GHB EST-SCNC: 8.9 MMOL/L
GLUCOSE SERPL-MCNC: 73 MG/DL (ref 65–99)
HBA1C MFR BLD: 7.2 %
POTASSIUM SERPL-SCNC: 4.2 MMOL/L (ref 3.5–5.3)
PROT SERPL-MCNC: 6.6 G/DL (ref 6.1–8.1)
SODIUM SERPL-SCNC: 138 MMOL/L (ref 135–146)

## 2025-08-29 DIAGNOSIS — I50.9 CONGESTIVE HEART FAILURE, UNSPECIFIED HF CHRONICITY, UNSPECIFIED HEART FAILURE TYPE: ICD-10-CM

## 2025-08-29 DIAGNOSIS — N18.32 CKD STAGE 3B, GFR 30-44 ML/MIN (MULTI): ICD-10-CM

## 2025-09-24 ENCOUNTER — APPOINTMENT (OUTPATIENT)
Dept: PHARMACY | Facility: HOSPITAL | Age: 85
End: 2025-09-24
Payer: MEDICARE

## 2025-10-09 ENCOUNTER — APPOINTMENT (OUTPATIENT)
Dept: OPHTHALMOLOGY | Facility: CLINIC | Age: 85
End: 2025-10-09
Payer: MEDICARE

## 2025-10-21 ENCOUNTER — APPOINTMENT (OUTPATIENT)
Dept: PRIMARY CARE | Facility: CLINIC | Age: 85
End: 2025-10-21
Payer: MEDICARE

## 2025-11-13 ENCOUNTER — APPOINTMENT (OUTPATIENT)
Dept: OTOLARYNGOLOGY | Facility: CLINIC | Age: 85
End: 2025-11-13
Payer: MEDICARE